# Patient Record
Sex: MALE | Race: WHITE | NOT HISPANIC OR LATINO | URBAN - METROPOLITAN AREA
[De-identification: names, ages, dates, MRNs, and addresses within clinical notes are randomized per-mention and may not be internally consistent; named-entity substitution may affect disease eponyms.]

---

## 2019-03-13 VITALS
DIASTOLIC BLOOD PRESSURE: 60 MMHG | RESPIRATION RATE: 19 BRPM | SYSTOLIC BLOOD PRESSURE: 130 MMHG | WEIGHT: 270 LBS | DIASTOLIC BLOOD PRESSURE: 68 MMHG | DIASTOLIC BLOOD PRESSURE: 86 MMHG | DIASTOLIC BLOOD PRESSURE: 77 MMHG | RESPIRATION RATE: 21 BRPM | HEART RATE: 86 BPM | DIASTOLIC BLOOD PRESSURE: 67 MMHG | RESPIRATION RATE: 18 BRPM | DIASTOLIC BLOOD PRESSURE: 72 MMHG | DIASTOLIC BLOOD PRESSURE: 90 MMHG | SYSTOLIC BLOOD PRESSURE: 131 MMHG | RESPIRATION RATE: 25 BRPM | SYSTOLIC BLOOD PRESSURE: 128 MMHG | OXYGEN SATURATION: 94 % | TEMPERATURE: 96.7 F | DIASTOLIC BLOOD PRESSURE: 58 MMHG | HEART RATE: 72 BPM | HEART RATE: 82 BPM | OXYGEN SATURATION: 97 % | DIASTOLIC BLOOD PRESSURE: 65 MMHG | TEMPERATURE: 97.9 F | HEART RATE: 93 BPM | OXYGEN SATURATION: 100 % | SYSTOLIC BLOOD PRESSURE: 113 MMHG | RESPIRATION RATE: 22 BRPM | HEART RATE: 67 BPM | HEART RATE: 74 BPM | SYSTOLIC BLOOD PRESSURE: 149 MMHG | HEART RATE: 77 BPM | SYSTOLIC BLOOD PRESSURE: 127 MMHG | DIASTOLIC BLOOD PRESSURE: 80 MMHG | DIASTOLIC BLOOD PRESSURE: 91 MMHG | SYSTOLIC BLOOD PRESSURE: 112 MMHG | OXYGEN SATURATION: 98 % | HEART RATE: 71 BPM | HEART RATE: 75 BPM | HEIGHT: 71 IN | HEART RATE: 73 BPM | SYSTOLIC BLOOD PRESSURE: 114 MMHG | SYSTOLIC BLOOD PRESSURE: 102 MMHG | OXYGEN SATURATION: 92 % | DIASTOLIC BLOOD PRESSURE: 78 MMHG | HEART RATE: 69 BPM | DIASTOLIC BLOOD PRESSURE: 62 MMHG | SYSTOLIC BLOOD PRESSURE: 111 MMHG | SYSTOLIC BLOOD PRESSURE: 145 MMHG | SYSTOLIC BLOOD PRESSURE: 116 MMHG | RESPIRATION RATE: 16 BRPM | OXYGEN SATURATION: 99 % | DIASTOLIC BLOOD PRESSURE: 70 MMHG

## 2019-03-14 PROBLEM — Z86.010 SURVEILLANCE DUE TO PRIOR COLONIC NEOPLASIA: Status: ACTIVE | Noted: 2019-03-15

## 2019-03-14 PROBLEM — Z86.010 PERSONAL HISTORY OF COLONIC POLYPS: Status: ACTIVE | Noted: 2019-03-15

## 2019-03-15 ENCOUNTER — AMBULATORY SURGICAL CENTER (AMBULATORY)
Dept: URBAN - METROPOLITAN AREA SURGERY 17 | Facility: SURGERY | Age: 59
End: 2019-03-15
Payer: COMMERCIAL

## 2019-03-15 ENCOUNTER — OFFICE (AMBULATORY)
Dept: URBAN - METROPOLITAN AREA PATHOLOGY 4 | Facility: PATHOLOGY | Age: 59
End: 2019-03-15
Payer: COMMERCIAL

## 2019-03-15 DIAGNOSIS — Z86.010 PERSONAL HISTORY OF COLONIC POLYPS: ICD-10-CM

## 2019-03-15 DIAGNOSIS — D12.2 BENIGN NEOPLASM OF ASCENDING COLON: ICD-10-CM

## 2019-03-15 DIAGNOSIS — K62.1 RECTAL POLYP: ICD-10-CM

## 2019-03-15 DIAGNOSIS — K57.30 DIVERTICULOSIS OF LARGE INTESTINE WITHOUT PERFORATION OR ABS: ICD-10-CM

## 2019-03-15 DIAGNOSIS — K63.5 POLYP OF COLON: ICD-10-CM

## 2019-03-15 DIAGNOSIS — D12.5 BENIGN NEOPLASM OF SIGMOID COLON: ICD-10-CM

## 2019-03-15 DIAGNOSIS — D12.8 BENIGN NEOPLASM OF RECTUM: ICD-10-CM

## 2019-03-15 LAB
GI HISTOLOGY: A. UNSPECIFIED: (no result)
GI HISTOLOGY: B. UNSPECIFIED: (no result)
GI HISTOLOGY: C. UNSPECIFIED: (no result)
GI HISTOLOGY: PDF REPORT: (no result)

## 2019-03-15 PROCEDURE — 45380 COLONOSCOPY AND BIOPSY: CPT | Mod: 33,59 | Performed by: INTERNAL MEDICINE

## 2019-03-15 PROCEDURE — 45380 COLONOSCOPY AND BIOPSY: CPT | Mod: 59,33 | Performed by: INTERNAL MEDICINE

## 2019-03-15 PROCEDURE — 88305 TISSUE EXAM BY PATHOLOGIST: CPT | Mod: 33 | Performed by: INTERNAL MEDICINE

## 2019-03-15 PROCEDURE — 45385 COLONOSCOPY W/LESION REMOVAL: CPT | Mod: 33 | Performed by: INTERNAL MEDICINE

## 2019-03-15 NOTE — SERVICEHPINOTES
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 I did his initial screening colonoscopy at age 48, he had an adenomatous polyp. Followup colonoscopy 5 years ago was negative. He is here today for followup colonoscopy. He has no lower GI complaints. Family history is negative for polyps or colon cancer.

## 2019-07-19 ENCOUNTER — OFFICE VISIT (OUTPATIENT)
Dept: INTERNAL MEDICINE | Facility: CLINIC | Age: 59
End: 2019-07-19

## 2019-07-19 VITALS
WEIGHT: 274.8 LBS | DIASTOLIC BLOOD PRESSURE: 90 MMHG | RESPIRATION RATE: 20 BRPM | HEIGHT: 70 IN | OXYGEN SATURATION: 96 % | SYSTOLIC BLOOD PRESSURE: 142 MMHG | HEART RATE: 78 BPM | BODY MASS INDEX: 39.34 KG/M2

## 2019-07-19 DIAGNOSIS — G47.33 OSA (OBSTRUCTIVE SLEEP APNEA): ICD-10-CM

## 2019-07-19 DIAGNOSIS — Z11.59 NEED FOR HEPATITIS C SCREENING TEST: ICD-10-CM

## 2019-07-19 DIAGNOSIS — E78.5 HYPERLIPIDEMIA, UNSPECIFIED HYPERLIPIDEMIA TYPE: ICD-10-CM

## 2019-07-19 DIAGNOSIS — D12.6 TUBULAR ADENOMA OF COLON: ICD-10-CM

## 2019-07-19 DIAGNOSIS — R20.0 NUMBNESS OF FOOT: ICD-10-CM

## 2019-07-19 DIAGNOSIS — I51.89 DIASTOLIC DYSFUNCTION: ICD-10-CM

## 2019-07-19 DIAGNOSIS — I10 ESSENTIAL HYPERTENSION: Primary | ICD-10-CM

## 2019-07-19 PROCEDURE — 99214 OFFICE O/P EST MOD 30 MIN: CPT | Performed by: INTERNAL MEDICINE

## 2019-07-19 RX ORDER — LOSARTAN POTASSIUM 100 MG/1
TABLET ORAL
Refills: 3 | COMMUNITY
Start: 2019-07-06 | End: 2019-07-19 | Stop reason: SDUPTHER

## 2019-07-19 RX ORDER — LOSARTAN POTASSIUM 100 MG/1
100 TABLET ORAL DAILY
Qty: 30 TABLET | Refills: 3 | Status: SHIPPED | OUTPATIENT
Start: 2019-07-19 | End: 2019-09-16 | Stop reason: SDUPTHER

## 2019-07-19 RX ORDER — HYDROCHLOROTHIAZIDE 25 MG/1
25 TABLET ORAL DAILY
Qty: 30 TABLET | Refills: 3 | Status: SHIPPED | OUTPATIENT
Start: 2019-07-19 | End: 2019-09-16 | Stop reason: SDUPTHER

## 2019-07-19 RX ORDER — HYDROCHLOROTHIAZIDE 12.5 MG/1
12.5 TABLET ORAL DAILY
Refills: 0 | COMMUNITY
Start: 2019-07-06 | End: 2019-07-19

## 2019-07-19 RX ORDER — ATORVASTATIN CALCIUM 20 MG/1
20 TABLET, FILM COATED ORAL DAILY
Qty: 30 TABLET | Refills: 3 | Status: SHIPPED | OUTPATIENT
Start: 2019-07-19 | End: 2019-09-16 | Stop reason: SDUPTHER

## 2019-07-19 RX ORDER — KETOCONAZOLE 20 MG/ML
SHAMPOO TOPICAL
Refills: 0 | COMMUNITY
Start: 2019-06-26

## 2019-07-19 RX ORDER — ATORVASTATIN CALCIUM 20 MG/1
20 TABLET, FILM COATED ORAL DAILY
Refills: 0 | COMMUNITY
Start: 2019-07-06 | End: 2019-07-19 | Stop reason: SDUPTHER

## 2019-07-19 NOTE — PROGRESS NOTES
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 He has not seen any flashing lights. He called Dr. Rubio and they scheduled an OV for August. He denies chest pain, dyspnea, or abdominal pain. His edema has improved. He uses his CPAP machine nightly. He had numbness in both feet up to the ankle recently. He has no back pain.  Allergies   Allergen Reactions   • Avelox [Moxifloxacin Hcl] Rash       Current Outpatient Medications on File Prior to Visit   Medication Sig Dispense Refill   • ketoconazole (NIZORAL) 2 % shampoo   0   • [DISCONTINUED] atorvastatin (LIPITOR) 20 MG tablet Take 20 mg by mouth Daily.  0   • [DISCONTINUED] hydrochlorothiazide (HYDRODIURIL) 12.5 MG tablet Take 12.5 mg by mouth Daily.  0   • [DISCONTINUED] losartan (COZAAR) 100 MG tablet TK ONE T PO QD  3     No current facility-administered medications on file prior to visit.        Past Medical History:   Diagnosis Date   • Cellulitis of lower extremity    • Chronic venous hypertension with complication involving both sides    • Decreased libido    • Diastolic dysfunction    • Eczema    • Hyperlipidemia    • Hypertension    • Leg swelling    • Low testosterone level in male    • Nocturia    • Numbness of foot    • Obesity (BMI 30-39.9)    • ROBERT (obstructive sleep apnea)    • Tubular adenoma of colon 03/15/2019    X2   • Varicose veins of legs        Past Surgical History:   Procedure Laterality Date   • COLONOSCOPY  03/15/2019    dr menjivar   • INGUINAL HERNIA REPAIR     • LASIK     • TONSILLECTOMY         Family History   Problem Relation Age of  "Onset   • Pneumonia Mother    • Dementia Mother    • Heart failure Father    • Hypertension Father    • Hypertension Brother        Social History     Socioeconomic History   • Marital status:      Spouse name: Not on file   • Number of children: Not on file   • Years of education: Not on file   • Highest education level: Not on file   Tobacco Use   • Smoking status: Never Smoker   • Smokeless tobacco: Never Used   Substance and Sexual Activity   • Alcohol use: Yes     Frequency: Monthly or less     Comment: occ   • Drug use: No   • Sexual activity: Defer           The following portions of the patient's history were reviewed and updated as appropriate: problem list, allergies, current medications, past medical history, past family history, past social history and past surgical history.    Review of Systems   Respiratory: Negative for shortness of breath.    Cardiovascular: Negative for chest pain.   Neurological: Positive for numbness.       Immunization History   Administered Date(s) Administered   • Flu Vaccine Intradermal Quad 18-64YR 10/18/2018   • Hepatitis A 05/22/2018, 12/27/2018   • Td 05/23/2009   • Tdap 06/19/2019       Objective   Vitals:    07/19/19 0758   BP: 142/90   Pulse: 78   Resp: 20   SpO2: 96%   Weight: 125 kg (274 lb 12.8 oz)   Height: 177.8 cm (70\")     Physical Exam   Constitutional: He appears well-developed and well-nourished.   HENT:   Head: Normocephalic and atraumatic.   Cardiovascular: Normal rate, regular rhythm, S1 normal, S2 normal and normal heart sounds.   Pulmonary/Chest: Effort normal and breath sounds normal.   Musculoskeletal:   1+ edema at ankles    2+ DTRs at knees and ankles    No sores on feet   Neurological: He is alert.   Skin: Skin is warm.   Psychiatric: He has a normal mood and affect.   Vitals reviewed.      Procedures    Assessment/Plan   New was seen today for follow-up, hyperlipidemia and hypertension.    Diagnoses and all orders for this " visit:    Essential hypertension  -     hydrochlorothiazide (HYDRODIURIL) 25 MG tablet; Take 1 tablet by mouth Daily.  -     losartan (COZAAR) 100 MG tablet; Take 1 tablet by mouth Daily.  -     Basic Metabolic Panel; Future    Diastolic dysfunction    ROBERT (obstructive sleep apnea)    Numbness of foot  -     Basic Metabolic Panel  -     TSH  -     Vitamin B12  -     RPR  -     Protein Elec + Interp, Serum  -     Protein Electrophoresis, Random Urine - Urine, Clean Catch  -     Hemoglobin A1c    Hyperlipidemia, unspecified hyperlipidemia type  -     atorvastatin (LIPITOR) 20 MG tablet; Take 1 tablet by mouth Daily.    Need for hepatitis C screening test  -     HCV Antibody Rfx To Qnt PCR    Tubular adenoma of colon             Increase HCTZ. Treatment for his diastolic dysfunction is better control of his BP. Check blood work today to eval his foot numbness. Discussed weight loss. Reviewed his cscope and colon polyps.    Return in about 4 weeks (around 8/16/2019).

## 2019-07-20 LAB
HCV AB S/CO SERPL IA: <0.1 S/CO RATIO (ref 0–0.9)
HCV AB SERPL QL IA: NORMAL

## 2019-07-22 LAB
ALBUMIN SERPL ELPH-MCNC: 3.9 G/DL (ref 2.9–4.4)
ALBUMIN/GLOB SERPL: 1.6 {RATIO} (ref 0.7–1.7)
ALPHA1 GLOB SERPL ELPH-MCNC: 0.2 G/DL (ref 0–0.4)
ALPHA2 GLOB SERPL ELPH-MCNC: 0.6 G/DL (ref 0.4–1)
B-GLOBULIN SERPL ELPH-MCNC: 0.8 G/DL (ref 0.7–1.3)
BUN SERPL-MCNC: 19 MG/DL (ref 8–23)
BUN/CREAT SERPL: 20.4 (ref 7–25)
CALCIUM SERPL-MCNC: 9.1 MG/DL (ref 8.6–10.5)
CHLORIDE SERPL-SCNC: 101 MMOL/L (ref 98–107)
CO2 SERPL-SCNC: 29 MMOL/L (ref 22–29)
CREAT SERPL-MCNC: 0.93 MG/DL (ref 0.76–1.27)
GAMMA GLOB SERPL ELPH-MCNC: 0.8 G/DL (ref 0.4–1.8)
GLOBULIN SER CALC-MCNC: 2.5 G/DL (ref 2.2–3.9)
GLUCOSE SERPL-MCNC: 105 MG/DL (ref 65–99)
HBA1C MFR BLD: 5.7 % (ref 4.8–5.6)
LABORATORY COMMENT REPORT: NORMAL
M PROTEIN SERPL ELPH-MCNC: NORMAL G/DL
POTASSIUM SERPL-SCNC: 3.9 MMOL/L (ref 3.5–5.2)
PROT PATTERN SERPL ELPH-IMP: NORMAL
PROT SERPL-MCNC: 6.4 G/DL (ref 6–8.5)
RPR SER QL: NORMAL
SODIUM SERPL-SCNC: 142 MMOL/L (ref 136–145)
TSH SERPL DL<=0.005 MIU/L-ACNC: 2.02 MIU/ML (ref 0.27–4.2)
VIT B12 SERPL-MCNC: 415 PG/ML (ref 211–946)

## 2019-07-25 ENCOUNTER — TELEPHONE (OUTPATIENT)
Dept: INTERNAL MEDICINE | Facility: CLINIC | Age: 59
End: 2019-07-25

## 2019-07-25 NOTE — TELEPHONE ENCOUNTER
msg left for pt to return call to be informed  ----- Message from Ania Renae sent at 7/25/2019 10:53 AM EDT -----      ----- Message -----  From: Joseph Harris MD  Sent: 7/22/2019   3:22 PM  To: Zee Bowers MA    Thyroid, b12, a1c all okay

## 2019-08-05 ENCOUNTER — RESULTS ENCOUNTER (OUTPATIENT)
Dept: INTERNAL MEDICINE | Facility: CLINIC | Age: 59
End: 2019-08-05

## 2019-08-05 DIAGNOSIS — I10 ESSENTIAL HYPERTENSION: ICD-10-CM

## 2019-08-23 ENCOUNTER — OFFICE VISIT (OUTPATIENT)
Dept: INTERNAL MEDICINE | Facility: CLINIC | Age: 59
End: 2019-08-23

## 2019-08-23 VITALS
SYSTOLIC BLOOD PRESSURE: 126 MMHG | WEIGHT: 278.2 LBS | HEIGHT: 71 IN | DIASTOLIC BLOOD PRESSURE: 78 MMHG | BODY MASS INDEX: 38.95 KG/M2

## 2019-08-23 DIAGNOSIS — E66.01 CLASS 2 SEVERE OBESITY DUE TO EXCESS CALORIES WITH SERIOUS COMORBIDITY AND BODY MASS INDEX (BMI) OF 38.0 TO 38.9 IN ADULT (HCC): ICD-10-CM

## 2019-08-23 DIAGNOSIS — Z12.5 SCREENING FOR PROSTATE CANCER: ICD-10-CM

## 2019-08-23 DIAGNOSIS — I10 ESSENTIAL HYPERTENSION: Primary | ICD-10-CM

## 2019-08-23 PROBLEM — H43.819 VITREOUS DETACHMENT: Status: ACTIVE | Noted: 2019-08-23

## 2019-08-23 PROBLEM — E66.812 CLASS 2 SEVERE OBESITY WITH SERIOUS COMORBIDITY AND BODY MASS INDEX (BMI) OF 38.0 TO 38.9 IN ADULT: Status: ACTIVE | Noted: 2019-08-23

## 2019-08-23 PROCEDURE — 99213 OFFICE O/P EST LOW 20 MIN: CPT | Performed by: INTERNAL MEDICINE

## 2019-08-23 NOTE — PROGRESS NOTES
Subjective        Chief Complaint   Patient presents with   • Hypertension     4 week fup review labs recent eye visit for floaters    • Med Refill       PHQ-2 Depression Screening  Little interest or pleasure in doing things? 0   Feeling down, depressed, or hopeless? 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 Rizwana Rubio for his left eye and he was told that he has a posterior vitreous detachment. His BP has been 135-140/80-86. He denies chest pain or dyspnea. He saw derm for an area on his right cheek and he had an SK frozen.  Allergies   Allergen Reactions   • Avelox [Moxifloxacin Hcl] Rash       Current Outpatient Medications on File Prior to Visit   Medication Sig Dispense Refill   • atorvastatin (LIPITOR) 20 MG tablet Take 1 tablet by mouth Daily. 30 tablet 3   • hydrochlorothiazide (HYDRODIURIL) 25 MG tablet Take 1 tablet by mouth Daily. 30 tablet 3   • ketoconazole (NIZORAL) 2 % shampoo   0   • losartan (COZAAR) 100 MG tablet Take 1 tablet by mouth Daily. 30 tablet 3     No current facility-administered medications on file prior to visit.        Past Medical History:   Diagnosis Date   • Cellulitis of lower extremity    • Chronic venous hypertension with complication involving both sides    • Decreased libido    • Diastolic dysfunction    • Eczema    • Hyperlipidemia    • Hypertension    • Leg swelling    • Low testosterone level in male    • Nocturia    • Numbness of foot    • Obesity (BMI 30-39.9)    • ROBERT (obstructive sleep apnea)    • Tubular adenoma of colon 03/15/2019    X2   • Varicose veins  "of legs        Past Surgical History:   Procedure Laterality Date   • COLONOSCOPY  03/15/2019    dr menjivar   • INGUINAL HERNIA REPAIR Bilateral    • LASIK     • TONSILLECTOMY AND ADENOIDECTOMY         Family History   Problem Relation Age of Onset   • Pneumonia Mother    • Dementia Mother    • Heart failure Father    • Hypertension Father    • Hypertension Brother        Social History     Socioeconomic History   • Marital status:      Spouse name: Not on file   • Number of children: Not on file   • Years of education: Not on file   • Highest education level: Not on file   Tobacco Use   • Smoking status: Never Smoker   • Smokeless tobacco: Never Used   Substance and Sexual Activity   • Alcohol use: Yes     Frequency: Monthly or less     Comment: occ   • Drug use: No   • Sexual activity: Defer           The following portions of the patient's history were reviewed and updated as appropriate: problem list, allergies, current medications, past medical history, past family history, past social history and past surgical history.    Review of Systems   Respiratory: Negative for shortness of breath.    Cardiovascular: Negative for chest pain.       Immunization History   Administered Date(s) Administered   • Flu Vaccine Intradermal Quad 18-64YR 10/18/2018   • Hepatitis A 05/22/2018, 12/27/2018   • Td 05/23/2009   • Tdap 06/19/2019       Objective   Vitals:    08/23/19 1614   BP: 126/78   Weight: 126 kg (278 lb 3.2 oz)   Height: 180.3 cm (71\")     Physical Exam   Constitutional: He appears well-developed and well-nourished.   HENT:   Head: Normocephalic and atraumatic.   Cardiovascular: Normal rate, regular rhythm, S1 normal, S2 normal and normal heart sounds.   Trace edema   Pulmonary/Chest: Effort normal and breath sounds normal.   Neurological: He is alert.   Skin: Skin is warm.   Psychiatric: He has a normal mood and affect.   Vitals reviewed.      Procedures    Assessment/Plan   New was seen today for " hypertension and med refill.    Diagnoses and all orders for this visit:    Essential hypertension  -     Comprehensive Metabolic Panel; Future  -     Lipid Panel With / Chol / HDL Ratio; Future    Screening for prostate cancer  -     PSA Screen; Future    Class 2 severe obesity due to excess calories with serious comorbidity and body mass index (BMI) of 38.0 to 38.9 in adult (CMS/[facility])             BP is good. Labs reviewed. Discussed losing weight.     Return in about 4 months (around 12/23/2019) for Annual physical.

## 2019-09-16 ENCOUNTER — TELEPHONE (OUTPATIENT)
Dept: INTERNAL MEDICINE | Facility: CLINIC | Age: 59
End: 2019-09-16

## 2019-09-16 DIAGNOSIS — I10 ESSENTIAL HYPERTENSION: ICD-10-CM

## 2019-09-16 DIAGNOSIS — E78.5 HYPERLIPIDEMIA, UNSPECIFIED HYPERLIPIDEMIA TYPE: ICD-10-CM

## 2019-09-16 RX ORDER — ATORVASTATIN CALCIUM 20 MG/1
20 TABLET, FILM COATED ORAL DAILY
Qty: 90 TABLET | Refills: 3 | Status: SHIPPED | OUTPATIENT
Start: 2019-09-16 | End: 2020-01-31 | Stop reason: SDUPTHER

## 2019-09-16 RX ORDER — HYDROCHLOROTHIAZIDE 25 MG/1
25 TABLET ORAL DAILY
Qty: 90 TABLET | Refills: 3 | Status: SHIPPED | OUTPATIENT
Start: 2019-09-16 | End: 2020-01-31 | Stop reason: SDUPTHER

## 2019-09-16 RX ORDER — LOSARTAN POTASSIUM 100 MG/1
100 TABLET ORAL DAILY
Qty: 90 TABLET | Refills: 3 | Status: SHIPPED | OUTPATIENT
Start: 2019-09-16 | End: 2020-01-31 | Stop reason: SDUPTHER

## 2019-09-16 NOTE — TELEPHONE ENCOUNTER
Patient is wanting his prescriptions switched to [facility] for Losartan, Atorvastatin, and HCTZ, patients insurance states he must use mail order.

## 2019-12-23 ENCOUNTER — RESULTS ENCOUNTER (OUTPATIENT)
Dept: INTERNAL MEDICINE | Facility: CLINIC | Age: 59
End: 2019-12-23

## 2019-12-23 DIAGNOSIS — I10 ESSENTIAL HYPERTENSION: ICD-10-CM

## 2019-12-23 DIAGNOSIS — Z12.5 SCREENING FOR PROSTATE CANCER: ICD-10-CM

## 2020-01-17 ENCOUNTER — OFFICE VISIT (OUTPATIENT)
Dept: INTERNAL MEDICINE | Facility: CLINIC | Age: 60
End: 2020-01-17

## 2020-01-17 VITALS
HEART RATE: 66 BPM | WEIGHT: 255.4 LBS | SYSTOLIC BLOOD PRESSURE: 132 MMHG | BODY MASS INDEX: 35.76 KG/M2 | DIASTOLIC BLOOD PRESSURE: 84 MMHG | HEIGHT: 71 IN

## 2020-01-17 DIAGNOSIS — Z00.00 WELLNESS EXAMINATION: Primary | ICD-10-CM

## 2020-01-17 DIAGNOSIS — I10 ESSENTIAL HYPERTENSION: ICD-10-CM

## 2020-01-17 DIAGNOSIS — G47.33 OSA (OBSTRUCTIVE SLEEP APNEA): ICD-10-CM

## 2020-01-17 DIAGNOSIS — E78.49 OTHER HYPERLIPIDEMIA: ICD-10-CM

## 2020-01-17 PROCEDURE — 99396 PREV VISIT EST AGE 40-64: CPT | Performed by: INTERNAL MEDICINE

## 2020-01-17 RX ORDER — METOPROLOL SUCCINATE 50 MG/1
50 TABLET, EXTENDED RELEASE ORAL DAILY
Qty: 30 TABLET | Refills: 2 | Status: SHIPPED | OUTPATIENT
Start: 2020-01-17 | End: 2020-03-20 | Stop reason: SDUPTHER

## 2020-01-17 NOTE — PROGRESS NOTES
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 He is exercising 4 days per week. He is down 23 pounds. He feels better. His edema is better. He is seeing Dr. Rubio for left posterior vitreous detachment. He uses his CPAP nightly. He sleeps 4.5 hours per night.  Allergies   Allergen Reactions   • Avelox [Moxifloxacin Hcl] Rash       Current Outpatient Medications on File Prior to Visit   Medication Sig Dispense Refill   • atorvastatin (LIPITOR) 20 MG tablet Take 1 tablet by mouth Daily. 90 tablet 3   • hydrochlorothiazide (HYDRODIURIL) 25 MG tablet Take 1 tablet by mouth Daily. 90 tablet 3   • ketoconazole (NIZORAL) 2 % shampoo   0   • losartan (COZAAR) 100 MG tablet Take 1 tablet by mouth Daily. 90 tablet 3     No current facility-administered medications on file prior to visit.        Past Medical History:   Diagnosis Date   • Cellulitis of lower extremity    • Chronic venous hypertension with complication involving both sides    • Decreased libido    • Diastolic dysfunction    • Eczema    • Hyperlipidemia    • Hypertension    • Leg swelling    • Low testosterone level in male    • Nocturia    • Numbness of foot    • Obesity (BMI 30-39.9)    • ROBERT (obstructive sleep apnea)    • Tubular adenoma of colon 03/15/2019    X2   • Varicose veins of legs        Past Surgical History:   Procedure Laterality Date   • COLONOSCOPY   03/15/2019    dr menjivar   • INGUINAL HERNIA REPAIR Bilateral    • LASIK     • TONSILLECTOMY AND ADENOIDECTOMY         Family History   Problem Relation Age of Onset   • Pneumonia Mother    • Dementia Mother    • Heart failure Father    • Hypertension Father    • Hypertension Brother        Social History     Socioeconomic History   • Marital status:      Spouse name: Not on file   • Number of children: Not on file   • Years of education: Not on file   • Highest education level: Not on file   Tobacco Use   • Smoking status: Never Smoker   • Smokeless tobacco: Never Used   Substance and Sexual Activity   • Alcohol use: Yes     Frequency: Monthly or less     Comment: occ   • Drug use: No   • Sexual activity: Defer           The following portions of the patient's history were reviewed and updated as appropriate: problem list, allergies, current medications, past medical history, past family history, past social history and past surgical history.    Review of Systems   Constitutional: Negative for chills, fever and unexpected weight loss.   HENT: Negative for postnasal drip and sore throat.    Eyes: Positive for visual disturbance. Negative for blurred vision.   Respiratory: Negative for cough, shortness of breath and wheezing.    Cardiovascular: Negative for chest pain and leg swelling.   Gastrointestinal: Negative for abdominal pain, blood in stool, nausea, vomiting and GERD.   Endocrine: Negative for polyuria.   Genitourinary: Negative for dysuria, frequency and hematuria.   Musculoskeletal: Negative for gait problem.   Skin: Negative for rash.   Allergic/Immunologic: Negative for immunocompromised state.   Neurological: Negative for weakness.   Hematological: Does not bruise/bleed easily.   Psychiatric/Behavioral: Negative for depressed mood. The patient is not nervous/anxious.        Immunization History   Administered Date(s) Administered   • Flu Vaccine Intradermal Quad 18-64YR 10/18/2018   • Flu Vaccine  "Quad PF >36MO 10/18/2018, 10/29/2019   • Hepatitis A 05/22/2018, 12/27/2018   • Td 05/23/2009   • Tdap 06/19/2019       Objective   Vitals:    01/17/20 1053   BP: 132/84   Pulse: 66   Weight: 116 kg (255 lb 6.4 oz)   Height: 180.3 cm (71\")     Body mass index is 35.62 kg/m².  Physical Exam   Constitutional: He appears well-developed and well-nourished.   HENT:   Head: Normocephalic and atraumatic.   Mouth/Throat: Oropharynx is clear and moist.   Eyes: Pupils are equal, round, and reactive to light. Conjunctivae and EOM are normal.   Neck: Neck supple. Carotid bruit is not present. No thyromegaly present.   Cardiovascular: Normal rate, regular rhythm and normal heart sounds.   No murmur heard.  Venous insufficiency in his legs   Pulmonary/Chest: Effort normal and breath sounds normal.   Abdominal: Soft. He exhibits no distension and no mass. There is no tenderness. There is no rebound.   Genitourinary: Rectum normal and prostate normal.   Lymphadenopathy:     He has no cervical adenopathy.   Neurological: He is alert.   Skin: Skin is warm.   Psychiatric: He has a normal mood and affect.   Vitals reviewed.      Procedures    Assessment/Plan   New was seen today for annual exam, hypertension and med refill.    Diagnoses and all orders for this visit:    Wellness examination    ROBETR (obstructive sleep apnea)    Other hyperlipidemia    Essential hypertension  -     metoprolol succinate XL (TOPROL-XL) 50 MG 24 hr tablet; Take 1 tablet by mouth Daily.             Reviewed labs. Discussed exercise; he is losing wt on purpose. BP is borderline so I will add Toprol; he was intolerant of Norvasc in the past with worsening edema. Discussed Shingrix. LDL is good. He uses a CPAP.    Return in about 2 months (around 3/17/2020), or 30 minutes.  "

## 2020-01-31 DIAGNOSIS — I10 ESSENTIAL HYPERTENSION: ICD-10-CM

## 2020-01-31 DIAGNOSIS — E78.5 HYPERLIPIDEMIA, UNSPECIFIED HYPERLIPIDEMIA TYPE: ICD-10-CM

## 2020-01-31 RX ORDER — LOSARTAN POTASSIUM 100 MG/1
100 TABLET ORAL DAILY
Qty: 90 TABLET | Refills: 3 | Status: SHIPPED | OUTPATIENT
Start: 2020-01-31 | End: 2020-09-24 | Stop reason: SDUPTHER

## 2020-01-31 RX ORDER — HYDROCHLOROTHIAZIDE 25 MG/1
25 TABLET ORAL DAILY
Qty: 90 TABLET | Refills: 3 | Status: SHIPPED | OUTPATIENT
Start: 2020-01-31 | End: 2020-09-24 | Stop reason: SDUPTHER

## 2020-01-31 RX ORDER — ATORVASTATIN CALCIUM 20 MG/1
20 TABLET, FILM COATED ORAL DAILY
Qty: 90 TABLET | Refills: 3 | Status: SHIPPED | OUTPATIENT
Start: 2020-01-31 | End: 2020-09-24 | Stop reason: SDUPTHER

## 2020-03-20 ENCOUNTER — OFFICE VISIT (OUTPATIENT)
Dept: INTERNAL MEDICINE | Facility: CLINIC | Age: 60
End: 2020-03-20

## 2020-03-20 VITALS
SYSTOLIC BLOOD PRESSURE: 118 MMHG | HEIGHT: 71 IN | WEIGHT: 245 LBS | DIASTOLIC BLOOD PRESSURE: 82 MMHG | HEART RATE: 78 BPM | BODY MASS INDEX: 34.3 KG/M2

## 2020-03-20 DIAGNOSIS — I10 ESSENTIAL HYPERTENSION: Primary | ICD-10-CM

## 2020-03-20 DIAGNOSIS — E66.01 CLASS 2 SEVERE OBESITY DUE TO EXCESS CALORIES WITH SERIOUS COMORBIDITY AND BODY MASS INDEX (BMI) OF 38.0 TO 38.9 IN ADULT (HCC): ICD-10-CM

## 2020-03-20 PROCEDURE — 99213 OFFICE O/P EST LOW 20 MIN: CPT | Performed by: INTERNAL MEDICINE

## 2020-03-20 RX ORDER — METOPROLOL SUCCINATE 50 MG/1
50 TABLET, EXTENDED RELEASE ORAL DAILY
Qty: 90 TABLET | Refills: 3 | Status: SHIPPED | OUTPATIENT
Start: 2020-03-20 | End: 2020-09-08 | Stop reason: SDUPTHER

## 2020-03-20 NOTE — PROGRESS NOTES
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 He has not problems with med changes. He denies chest pain or dyspnea. He is exercising 5 days per week. He has lost almost 30 pounds and he felt like his weight loss has plateaued.  Allergies   Allergen Reactions   • Avelox [Moxifloxacin Hcl] Rash       Current Outpatient Medications on File Prior to Visit   Medication Sig Dispense Refill   • atorvastatin (LIPITOR) 20 MG tablet Take 1 tablet by mouth Daily. 90 tablet 3   • hydroCHLOROthiazide (HYDRODIURIL) 25 MG tablet Take 1 tablet by mouth Daily. 90 tablet 3   • ketoconazole (NIZORAL) 2 % shampoo   0   • losartan (COZAAR) 100 MG tablet Take 1 tablet by mouth Daily. 90 tablet 3   • [DISCONTINUED] metoprolol succinate XL (TOPROL-XL) 50 MG 24 hr tablet Take 1 tablet by mouth Daily. 30 tablet 2     No current facility-administered medications on file prior to visit.        Past Medical History:   Diagnosis Date   • Cellulitis of lower extremity    • Chronic venous hypertension with complication involving both sides    • Decreased libido    • Diastolic dysfunction    • Eczema    • Hyperlipidemia    • Hypertension    • Leg swelling    • Low testosterone level in male    • Nocturia    • Numbness of foot    • Obesity (BMI 30-39.9)    • ROBERT (obstructive sleep apnea)    • Tubular adenoma of colon 03/15/2019    X2   • Varicose veins of legs        Past Surgical History:   Procedure Laterality Date   • COLONOSCOPY  03/15/2019    dr menjivar  "  • INGUINAL HERNIA REPAIR Bilateral    • LASIK     • TONSILLECTOMY AND ADENOIDECTOMY         Family History   Problem Relation Age of Onset   • Pneumonia Mother    • Dementia Mother    • Heart failure Father    • Hypertension Father    • Hypertension Brother        Social History     Socioeconomic History   • Marital status:      Spouse name: Not on file   • Number of children: Not on file   • Years of education: Not on file   • Highest education level: Not on file   Tobacco Use   • Smoking status: Never Smoker   • Smokeless tobacco: Never Used   Substance and Sexual Activity   • Alcohol use: Yes     Frequency: Monthly or less     Comment: occ   • Drug use: No   • Sexual activity: Defer           The following portions of the patient's history were reviewed and updated as appropriate: problem list, allergies, current medications, past medical history, past family history, past social history and past surgical history.    Review of Systems   Respiratory: Negative for shortness of breath.    Cardiovascular: Negative for chest pain.       Immunization History   Administered Date(s) Administered   • Flu Vaccine Intradermal Quad 18-64YR 10/18/2018   • Flu Vaccine Quad PF >36MO 10/18/2018, 10/29/2019   • Hepatitis A 05/22/2018, 12/27/2018   • Td 05/23/2009   • Tdap 06/19/2019       Objective   Vitals:    03/20/20 1407   BP: 118/82   Pulse: 78   Weight: 111 kg (245 lb)   Height: 180.3 cm (71\")     Body mass index is 34.17 kg/m².  Physical Exam   Constitutional: He appears well-developed and well-nourished.   HENT:   Head: Normocephalic and atraumatic.   Cardiovascular: Normal rate, regular rhythm, S1 normal, S2 normal and normal heart sounds.   Pulmonary/Chest: Effort normal and breath sounds normal.   Neurological: He is alert.   Skin: Skin is warm.   Psychiatric: He has a normal mood and affect.   Vitals reviewed.      Procedures    Assessment/Plan   New was seen today for hypertension.    Diagnoses and all " orders for this visit:    Essential hypertension  -     metoprolol succinate XL (TOPROL-XL) 50 MG 24 hr tablet; Take 1 tablet by mouth Daily.    Class 2 severe obesity due to excess calories with serious comorbidity and body mass index (BMI) of 38.0 to 38.9 in adult (CMS/[facility])             BP is excellent. He is losing wt on purpose. Discussed calorie intake and exercising 150 minutes per week.    Return in about 6 months (around 9/20/2020).

## 2020-09-08 ENCOUNTER — TELEPHONE (OUTPATIENT)
Dept: INTERNAL MEDICINE | Facility: CLINIC | Age: 60
End: 2020-09-08

## 2020-09-08 DIAGNOSIS — I10 ESSENTIAL HYPERTENSION: ICD-10-CM

## 2020-09-08 RX ORDER — METOPROLOL SUCCINATE 50 MG/1
50 TABLET, EXTENDED RELEASE ORAL DAILY
Qty: 90 TABLET | Refills: 3 | Status: SHIPPED | OUTPATIENT
Start: 2020-09-08 | End: 2020-09-24 | Stop reason: SDUPTHER

## 2020-09-24 ENCOUNTER — OFFICE VISIT (OUTPATIENT)
Dept: INTERNAL MEDICINE | Facility: CLINIC | Age: 60
End: 2020-09-24

## 2020-09-24 VITALS
BODY MASS INDEX: 34.58 KG/M2 | SYSTOLIC BLOOD PRESSURE: 132 MMHG | HEIGHT: 71 IN | WEIGHT: 247 LBS | DIASTOLIC BLOOD PRESSURE: 84 MMHG | TEMPERATURE: 98 F

## 2020-09-24 DIAGNOSIS — E78.49 OTHER HYPERLIPIDEMIA: ICD-10-CM

## 2020-09-24 DIAGNOSIS — Z12.5 PROSTATE CANCER SCREENING: ICD-10-CM

## 2020-09-24 DIAGNOSIS — I10 ESSENTIAL HYPERTENSION: Primary | ICD-10-CM

## 2020-09-24 PROCEDURE — 99213 OFFICE O/P EST LOW 20 MIN: CPT | Performed by: INTERNAL MEDICINE

## 2020-09-24 RX ORDER — ATORVASTATIN CALCIUM 20 MG/1
20 TABLET, FILM COATED ORAL DAILY
Qty: 90 TABLET | Refills: 3 | Status: SHIPPED | OUTPATIENT
Start: 2020-09-24 | End: 2021-01-07

## 2020-09-24 RX ORDER — METOPROLOL SUCCINATE 50 MG/1
50 TABLET, EXTENDED RELEASE ORAL DAILY
Qty: 90 TABLET | Refills: 3 | Status: SHIPPED | OUTPATIENT
Start: 2020-09-24 | End: 2021-03-26

## 2020-09-24 RX ORDER — HYDROCHLOROTHIAZIDE 25 MG/1
25 TABLET ORAL DAILY
Qty: 90 TABLET | Refills: 3 | Status: SHIPPED | OUTPATIENT
Start: 2020-09-24 | End: 2021-01-07

## 2020-09-24 RX ORDER — LOSARTAN POTASSIUM 100 MG/1
100 TABLET ORAL DAILY
Qty: 90 TABLET | Refills: 3 | Status: SHIPPED | OUTPATIENT
Start: 2020-09-24 | End: 2021-01-07

## 2020-09-24 NOTE — PROGRESS NOTES
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 He denies chest pain or dyspnea. His wt has gone up just a little. He still exercises but does not watch diet. He uses   Allergies   Allergen Reactions   • Avelox [Moxifloxacin Hcl] Rash       Current Outpatient Medications on File Prior to Visit   Medication Sig Dispense Refill   • ketoconazole (NIZORAL) 2 % shampoo   0   • [DISCONTINUED] atorvastatin (LIPITOR) 20 MG tablet Take 1 tablet by mouth Daily. 90 tablet 3   • [DISCONTINUED] hydroCHLOROthiazide (HYDRODIURIL) 25 MG tablet Take 1 tablet by mouth Daily. 90 tablet 3   • [DISCONTINUED] losartan (COZAAR) 100 MG tablet Take 1 tablet by mouth Daily. 90 tablet 3   • [DISCONTINUED] metoprolol succinate XL (TOPROL-XL) 50 MG 24 hr tablet Take 1 tablet by mouth Daily. 90 tablet 3     No current facility-administered medications on file prior to visit.        Past Medical History:   Diagnosis Date   • Cellulitis of lower extremity    • Chronic venous hypertension with complication involving both sides    • Decreased libido    • Diastolic dysfunction    • Eczema    • Hyperlipidemia    • Hypertension    • Leg swelling    • Low testosterone level in male    • Nocturia    • Numbness of foot    • Obesity (BMI 30-39.9)    • ROBERT (obstructive sleep apnea)    • Tubular adenoma of colon 03/15/2019    X2   • Varicose veins of legs        Past Surgical History:   Procedure Laterality Date   • COLONOSCOPY  03/15/2019    dr menjivar   • INGUINAL HERNIA  "REPAIR Bilateral    • LASIK     • TONSILLECTOMY AND ADENOIDECTOMY         Family History   Problem Relation Age of Onset   • Pneumonia Mother    • Dementia Mother    • Heart failure Father    • Hypertension Father    • Hypertension Brother        Social History     Socioeconomic History   • Marital status:      Spouse name: Not on file   • Number of children: Not on file   • Years of education: Not on file   • Highest education level: Not on file   Tobacco Use   • Smoking status: Never Smoker   • Smokeless tobacco: Never Used   Substance and Sexual Activity   • Alcohol use: Yes     Frequency: Monthly or less     Comment: occ   • Drug use: No   • Sexual activity: Defer           The following portions of the patient's history were reviewed and updated as appropriate: problem list, allergies, current medications, past medical history, past family history, past social history and past surgical history.    Review of Systems   Respiratory: Negative for shortness of breath.    Cardiovascular: Negative for chest pain.       Immunization History   Administered Date(s) Administered   • Flu Vaccine Intradermal Quad 18-64YR 10/18/2018   • Flu Vaccine Quad PF >36MO 10/18/2018, 10/29/2019   • Hepatitis A 05/22/2018, 12/27/2018   • Td 05/23/2009   • Tdap 06/19/2019       Objective   Vitals:    09/24/20 1647   BP: 132/84   Temp: 98 °F (36.7 °C)   Weight: 112 kg (247 lb)   Height: 180.3 cm (71\")     Body mass index is 34.45 kg/m².  Physical Exam  Vitals signs reviewed.   Constitutional:       Appearance: He is well-developed.   HENT:      Head: Normocephalic and atraumatic.   Cardiovascular:      Rate and Rhythm: Normal rate and regular rhythm.      Heart sounds: Normal heart sounds, S1 normal and S2 normal.   Pulmonary:      Effort: Pulmonary effort is normal.      Breath sounds: Normal breath sounds.   Skin:     General: Skin is warm.   Neurological:      Mental Status: He is alert.   Psychiatric:         Behavior: " Behavior normal.         Procedures    Assessment/Plan   New was seen today for hypertension.    Diagnoses and all orders for this visit:    Essential hypertension  -     hydroCHLOROthiazide (HYDRODIURIL) 25 MG tablet; Take 1 tablet by mouth Daily.  -     losartan (COZAAR) 100 MG tablet; Take 1 tablet by mouth Daily.  -     metoprolol succinate XL (TOPROL-XL) 50 MG 24 hr tablet; Take 1 tablet by mouth Daily.    Other hyperlipidemia  -     Comprehensive Metabolic Panel; Future  -     Lipid Panel With / Chol / HDL Ratio; Future  -     atorvastatin (LIPITOR) 20 MG tablet; Take 1 tablet by mouth Daily.    Prostate cancer screening  -     PSA Screen; Future             Reviewed labs. LDL is excellent. He will get the flu shot at work. BP is borderline; discussed being more diligent with diet to lose weight. Discussed Shingrix.    Return in about 6 months (around 3/24/2021) for Annual physical.

## 2021-01-07 DIAGNOSIS — I10 ESSENTIAL HYPERTENSION: ICD-10-CM

## 2021-01-07 DIAGNOSIS — E78.49 OTHER HYPERLIPIDEMIA: ICD-10-CM

## 2021-01-07 RX ORDER — HYDROCHLOROTHIAZIDE 25 MG/1
TABLET ORAL
Qty: 90 TABLET | Refills: 3 | Status: SHIPPED | OUTPATIENT
Start: 2021-01-07 | End: 2021-03-26 | Stop reason: SDUPTHER

## 2021-01-07 RX ORDER — ATORVASTATIN CALCIUM 20 MG/1
TABLET, FILM COATED ORAL
Qty: 90 TABLET | Refills: 3 | Status: SHIPPED | OUTPATIENT
Start: 2021-01-07 | End: 2021-03-26 | Stop reason: SDUPTHER

## 2021-01-07 RX ORDER — LOSARTAN POTASSIUM 100 MG/1
TABLET ORAL
Qty: 90 TABLET | Refills: 3 | Status: SHIPPED | OUTPATIENT
Start: 2021-01-07 | End: 2021-03-26 | Stop reason: SDUPTHER

## 2021-03-26 ENCOUNTER — OFFICE VISIT (OUTPATIENT)
Dept: INTERNAL MEDICINE | Facility: CLINIC | Age: 61
End: 2021-03-26

## 2021-03-26 VITALS
HEART RATE: 72 BPM | TEMPERATURE: 97.5 F | DIASTOLIC BLOOD PRESSURE: 84 MMHG | WEIGHT: 233 LBS | BODY MASS INDEX: 32.62 KG/M2 | SYSTOLIC BLOOD PRESSURE: 134 MMHG | HEIGHT: 71 IN

## 2021-03-26 DIAGNOSIS — Z00.00 WELLNESS EXAMINATION: Primary | ICD-10-CM

## 2021-03-26 DIAGNOSIS — I10 ESSENTIAL HYPERTENSION: Chronic | ICD-10-CM

## 2021-03-26 DIAGNOSIS — G47.33 OSA (OBSTRUCTIVE SLEEP APNEA): ICD-10-CM

## 2021-03-26 DIAGNOSIS — E78.49 OTHER HYPERLIPIDEMIA: Chronic | ICD-10-CM

## 2021-03-26 PROCEDURE — 99396 PREV VISIT EST AGE 40-64: CPT | Performed by: INTERNAL MEDICINE

## 2021-03-26 RX ORDER — HYDROCHLOROTHIAZIDE 25 MG/1
25 TABLET ORAL DAILY
Qty: 90 TABLET | Refills: 3 | Status: SHIPPED | OUTPATIENT
Start: 2021-03-26 | End: 2021-04-08 | Stop reason: SDUPTHER

## 2021-03-26 RX ORDER — ATORVASTATIN CALCIUM 20 MG/1
20 TABLET, FILM COATED ORAL DAILY
Qty: 90 TABLET | Refills: 3 | Status: SHIPPED | OUTPATIENT
Start: 2021-03-26 | End: 2021-04-08 | Stop reason: SDUPTHER

## 2021-03-26 RX ORDER — LOSARTAN POTASSIUM 100 MG/1
100 TABLET ORAL DAILY
Qty: 90 TABLET | Refills: 3 | Status: SHIPPED | OUTPATIENT
Start: 2021-03-26 | End: 2021-04-08 | Stop reason: SDUPTHER

## 2021-03-26 RX ORDER — METOPROLOL SUCCINATE 100 MG/1
100 TABLET, EXTENDED RELEASE ORAL DAILY
Qty: 90 TABLET | Refills: 3 | Status: SHIPPED | OUTPATIENT
Start: 2021-03-26 | End: 2021-04-08 | Stop reason: SDUPTHER

## 2021-03-26 NOTE — PROGRESS NOTES
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 He uses his CPAP nightly. He has been exercising 5 days per week.  Allergies   Allergen Reactions   • Avelox [Moxifloxacin Hcl] Rash       Current Outpatient Medications on File Prior to Visit   Medication Sig Dispense Refill   • ketoconazole (NIZORAL) 2 % shampoo   0   • [DISCONTINUED] atorvastatin (LIPITOR) 20 MG tablet TAKE 1 TABLET DAILY 90 tablet 3   • [DISCONTINUED] hydroCHLOROthiazide (HYDRODIURIL) 25 MG tablet TAKE 1 TABLET DAILY 90 tablet 3   • [DISCONTINUED] losartan (COZAAR) 100 MG tablet TAKE 1 TABLET DAILY 90 tablet 3   • [DISCONTINUED] metoprolol succinate XL (TOPROL-XL) 50 MG 24 hr tablet Take 1 tablet by mouth Daily. 90 tablet 3     No current facility-administered medications on file prior to visit.       Past Medical History:   Diagnosis Date   • Cellulitis of lower extremity    • Chronic venous hypertension with complication involving both sides    • Decreased libido    • Eczema    • Leg swelling    • Low testosterone level in male    • Nocturia    • Obesity (BMI 30-39.9)    • Tubular adenoma of colon 03/15/2019    X2   • Varicose veins of legs        Past Surgical History:   Procedure Laterality Date   • COLONOSCOPY  03/15/2019    dr menjivar   • INGUINAL HERNIA REPAIR Bilateral    • LASIK     • TONSILLECTOMY AND ADENOIDECTOMY         Family History   Problem Relation Age of Onset   • Pneumonia Mother    • Dementia Mother    • Heart failure  Father    • Hypertension Father    • Hypertension Brother        Social History     Socioeconomic History   • Marital status:      Spouse name: Not on file   • Number of children: Not on file   • Years of education: Not on file   • Highest education level: Not on file   Tobacco Use   • Smoking status: Never Smoker   • Smokeless tobacco: Never Used   Substance and Sexual Activity   • Alcohol use: Yes     Comment: rare   • Drug use: No   • Sexual activity: Defer           The following portions of the patient's history were reviewed and updated as appropriate: problem list, allergies, current medications, past medical history, past family history, past social history and past surgical history.    Review of Systems   Constitutional: Negative for chills, fever and unexpected weight loss.   HENT: Negative for postnasal drip and sore throat.    Eyes: Negative for blurred vision.   Respiratory: Negative for cough, shortness of breath and wheezing.    Cardiovascular: Negative for chest pain and leg swelling.   Gastrointestinal: Negative for abdominal pain, blood in stool, nausea, vomiting and GERD.   Endocrine: Negative for polyuria.   Genitourinary: Negative for dysuria, frequency and hematuria.   Musculoskeletal: Negative for gait problem.   Skin: Negative for rash.   Allergic/Immunologic: Negative for immunocompromised state.   Neurological: Negative for weakness.   Hematological: Does not bruise/bleed easily.   Psychiatric/Behavioral: Negative for depressed mood. The patient is not nervous/anxious.        Immunization History   Administered Date(s) Administered   • COVID-19 (PFIZER) 12/28/2020, 01/18/2021   • Flu Vaccine Intradermal Quad 18-64YR 10/18/2018   • Flu Vaccine Quad PF >36MO 10/18/2018, 10/29/2019   • Hepatitis A 05/22/2018, 12/27/2018   • Td 05/23/2009   • Tdap 06/19/2019       Objective   Vitals:    03/26/21 0754   BP: 134/84   Pulse: 72   Temp: 97.5 °F (36.4 °C)   Weight: 106 kg (233 lb)   Height:  "180.3 cm (71\")     Body mass index is 32.5 kg/m².  Physical Exam  Vitals reviewed.   Constitutional:       Appearance: He is well-developed.   HENT:      Head: Normocephalic and atraumatic.      Mouth/Throat:      Mouth: Mucous membranes are moist.      Pharynx: Oropharynx is clear.   Eyes:      Extraocular Movements: Extraocular movements intact.      Conjunctiva/sclera: Conjunctivae normal.      Pupils: Pupils are equal, round, and reactive to light.   Neck:      Thyroid: No thyromegaly.      Vascular: No carotid bruit.   Cardiovascular:      Rate and Rhythm: Normal rate and regular rhythm.      Heart sounds: Normal heart sounds. No murmur heard.     Pulmonary:      Effort: Pulmonary effort is normal.      Breath sounds: Normal breath sounds.   Abdominal:      General: There is no distension.      Palpations: Abdomen is soft. There is no mass.      Tenderness: There is no abdominal tenderness. There is no rebound.   Genitourinary:     Prostate: Enlarged. Not tender and no nodules present.      Rectum: Normal.   Musculoskeletal:      Cervical back: Neck supple.   Lymphadenopathy:      Cervical: No cervical adenopathy.   Skin:     General: Skin is warm.   Neurological:      Mental Status: He is alert.   Psychiatric:         Behavior: Behavior normal.         Procedures    Assessment/Plan   Diagnoses and all orders for this visit:    1. Wellness examination (Primary)    2. Essential hypertension  -     metoprolol succinate XL (Toprol XL) 100 MG 24 hr tablet; Take 1 tablet by mouth Daily.  Dispense: 90 tablet; Refill: 3  -     hydroCHLOROthiazide (HYDRODIURIL) 25 MG tablet; Take 1 tablet by mouth Daily.  Dispense: 90 tablet; Refill: 3  -     losartan (COZAAR) 100 MG tablet; Take 1 tablet by mouth Daily.  Dispense: 90 tablet; Refill: 3    3. Other hyperlipidemia  -     atorvastatin (LIPITOR) 20 MG tablet; Take 1 tablet by mouth Daily.  Dispense: 90 tablet; Refill: 3    4. ROBERT (obstructive sleep apnea)           "     Reviewed labs. Recc Shingrix. Discussed exercising 150 minutes per week. BP is elevated so I will increase Toprol. LDL is excellent. He uses a CPAP.  Return in about 2 months (around 5/26/2021).

## 2021-04-08 DIAGNOSIS — E78.49 OTHER HYPERLIPIDEMIA: Chronic | ICD-10-CM

## 2021-04-08 DIAGNOSIS — I10 ESSENTIAL HYPERTENSION: Chronic | ICD-10-CM

## 2021-04-08 RX ORDER — HYDROCHLOROTHIAZIDE 25 MG/1
25 TABLET ORAL DAILY
Qty: 90 TABLET | Refills: 3 | Status: SHIPPED | OUTPATIENT
Start: 2021-04-08 | End: 2021-12-17 | Stop reason: SDUPTHER

## 2021-04-08 RX ORDER — LOSARTAN POTASSIUM 100 MG/1
100 TABLET ORAL DAILY
Qty: 90 TABLET | Refills: 3 | Status: SHIPPED | OUTPATIENT
Start: 2021-04-08 | End: 2021-12-17 | Stop reason: SDUPTHER

## 2021-04-08 RX ORDER — METOPROLOL SUCCINATE 100 MG/1
100 TABLET, EXTENDED RELEASE ORAL DAILY
Qty: 90 TABLET | Refills: 3 | Status: SHIPPED | OUTPATIENT
Start: 2021-04-08 | End: 2021-12-17 | Stop reason: SDUPTHER

## 2021-04-08 RX ORDER — ATORVASTATIN CALCIUM 20 MG/1
20 TABLET, FILM COATED ORAL DAILY
Qty: 90 TABLET | Refills: 3 | Status: SHIPPED | OUTPATIENT
Start: 2021-04-08 | End: 2021-12-17 | Stop reason: SDUPTHER

## 2021-04-08 NOTE — TELEPHONE ENCOUNTER
Caller: New Krause MD    Relationship: Self    Best call back number:[phone]    Medication needed:   Requested Prescriptions     Pending Prescriptions Disp Refills   • losartan (COZAAR) 100 MG tablet 90 tablet 3     Sig: Take 1 tablet by mouth Daily.   • metoprolol succinate XL (Toprol XL) 100 MG 24 hr tablet 90 tablet 3     Sig: Take 1 tablet by mouth Daily.   • hydroCHLOROthiazide (HYDRODIURIL) 25 MG tablet 90 tablet 3     Sig: Take 1 tablet by mouth Daily.   • atorvastatin (LIPITOR) 20 MG tablet 90 tablet 3     Sig: Take 1 tablet by mouth Daily.       When do you need the refill by: ASAP      Does the patient have less than a 3 day supply:  [x] Yes  [] No    What is the patient's preferred pharmacy[facility] [place] - [address] AT PORTAL TO REG[facility] - [phone] [facility] [phone] FX

## 2021-06-11 ENCOUNTER — OFFICE VISIT (OUTPATIENT)
Dept: INTERNAL MEDICINE | Facility: CLINIC | Age: 61
End: 2021-06-11

## 2021-06-11 VITALS
DIASTOLIC BLOOD PRESSURE: 76 MMHG | TEMPERATURE: 98 F | SYSTOLIC BLOOD PRESSURE: 130 MMHG | HEIGHT: 71 IN | BODY MASS INDEX: 30.65 KG/M2 | WEIGHT: 218.9 LBS

## 2021-06-11 DIAGNOSIS — R68.89 FORGETFULNESS: ICD-10-CM

## 2021-06-11 DIAGNOSIS — I10 ESSENTIAL HYPERTENSION: Primary | Chronic | ICD-10-CM

## 2021-06-11 PROCEDURE — 99213 OFFICE O/P EST LOW 20 MIN: CPT | Performed by: INTERNAL MEDICINE

## 2021-06-11 NOTE — PROGRESS NOTES
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 He is exercises 5 days per week with brisk walk or treadmill. His diet has been low carbs and portion control. His BP has been 120-130/70s. He has to look up doses of meds more often. He denies problems with driving or finances. He only sleeps 4 to 4.5 hours per night. He uses a CPAP.  Allergies   Allergen Reactions   • Avelox [Moxifloxacin Hcl] Rash       Current Outpatient Medications on File Prior to Visit   Medication Sig Dispense Refill   • atorvastatin (LIPITOR) 20 MG tablet Take 1 tablet by mouth Daily. 90 tablet 3   • hydroCHLOROthiazide (HYDRODIURIL) 25 MG tablet Take 1 tablet by mouth Daily. 90 tablet 3   • ketoconazole (NIZORAL) 2 % shampoo   0   • losartan (COZAAR) 100 MG tablet Take 1 tablet by mouth Daily. 90 tablet 3   • metoprolol succinate XL (Toprol XL) 100 MG 24 hr tablet Take 1 tablet by mouth Daily. 90 tablet 3     No current facility-administered medications on file prior to visit.       Past Medical History:   Diagnosis Date   • Cellulitis of lower extremity    • Chronic venous hypertension with complication involving both sides    • Decreased libido    • Eczema    • Leg swelling    • Low testosterone level in male    • Nocturia    • Obesity (BMI 30-39.9)    • Tubular adenoma of colon 03/15/2019    X2   • Varicose veins of legs        Past Surgical History:   Procedure Laterality Date   • COLONOSCOPY  03/15/2019    dr menjivar   • INGUINAL HERNIA  "REPAIR Bilateral    • LASIK     • TONSILLECTOMY AND ADENOIDECTOMY         Family History   Problem Relation Age of Onset   • Pneumonia Mother    • Dementia Mother    • Heart failure Father    • Hypertension Father    • Hypertension Brother        Social History     Socioeconomic History   • Marital status:      Spouse name: Not on file   • Number of children: Not on file   • Years of education: Not on file   • Highest education level: Not on file   Tobacco Use   • Smoking status: Never Smoker   • Smokeless tobacco: Never Used   Substance and Sexual Activity   • Alcohol use: Yes     Comment: rare   • Drug use: No   • Sexual activity: Defer           The following portions of the patient's history were reviewed and updated as appropriate: problem list, allergies, current medications, past medical history, past family history, past social history and past surgical history.    Review of Systems    Immunization History   Administered Date(s) Administered   • COVID-19 (PFIZER) 12/28/2020, 01/18/2021   • Flu Vaccine Intradermal Quad 18-64YR 10/18/2018   • Flu Vaccine Quad PF >36MO 10/18/2018, 10/29/2019   • Hepatitis A 05/22/2018, 12/27/2018   • Td 05/23/2009   • Tdap 06/19/2019       Objective   Vitals:    06/11/21 1644   BP: 130/76   Temp: 98 °F (36.7 °C)   Weight: 99.3 kg (218 lb 14.4 oz)   Height: 180.3 cm (71\")     Body mass index is 30.53 kg/m².  Physical Exam  Vitals reviewed.   Constitutional:       Appearance: He is well-developed.   HENT:      Head: Normocephalic and atraumatic.   Cardiovascular:      Rate and Rhythm: Normal rate and regular rhythm.      Heart sounds: Normal heart sounds, S1 normal and S2 normal.   Pulmonary:      Effort: Pulmonary effort is normal.      Breath sounds: Normal breath sounds.   Skin:     General: Skin is warm.   Neurological:      Mental Status: He is alert.      Comments: He knows the year, month, day, date, season, floor, city, state, name of campus.    Psychiatric:        "  Behavior: Behavior normal.         Procedures    Assessment/Plan   Diagnoses and all orders for this visit:    1. Essential hypertension (Primary)  -     Basic Metabolic Panel; Future    2. Forgetfulness  Comments:  Discussed getting formal memory testing at [facility]. He wants to wait at this point and I think that is reasonable. He will call for changes.  Orders:  -     TSH  -     Vitamin B12             He is losing weight on purpose. BP is good. Encouraged him to get more sleep. Recjase Pichardo.    Return in about 6 months (around 12/11/2021) for Lab Before FUP.

## 2021-12-17 ENCOUNTER — OFFICE VISIT (OUTPATIENT)
Dept: INTERNAL MEDICINE | Facility: CLINIC | Age: 61
End: 2021-12-17

## 2021-12-17 VITALS
SYSTOLIC BLOOD PRESSURE: 116 MMHG | BODY MASS INDEX: 32.2 KG/M2 | DIASTOLIC BLOOD PRESSURE: 82 MMHG | HEIGHT: 71 IN | TEMPERATURE: 97.5 F | WEIGHT: 230 LBS

## 2021-12-17 DIAGNOSIS — I10 ESSENTIAL HYPERTENSION: Primary | Chronic | ICD-10-CM

## 2021-12-17 DIAGNOSIS — E78.49 OTHER HYPERLIPIDEMIA: Chronic | ICD-10-CM

## 2021-12-17 DIAGNOSIS — G47.33 OSA (OBSTRUCTIVE SLEEP APNEA): ICD-10-CM

## 2021-12-17 DIAGNOSIS — Z12.5 PROSTATE CANCER SCREENING: ICD-10-CM

## 2021-12-17 PROCEDURE — 99214 OFFICE O/P EST MOD 30 MIN: CPT | Performed by: INTERNAL MEDICINE

## 2021-12-17 RX ORDER — ATORVASTATIN CALCIUM 20 MG/1
20 TABLET, FILM COATED ORAL DAILY
Qty: 90 TABLET | Refills: 3 | Status: SHIPPED | OUTPATIENT
Start: 2021-12-17 | End: 2022-04-06 | Stop reason: SDUPTHER

## 2021-12-17 RX ORDER — HYDROCHLOROTHIAZIDE 25 MG/1
25 TABLET ORAL DAILY
Qty: 90 TABLET | Refills: 3 | Status: SHIPPED | OUTPATIENT
Start: 2021-12-17 | End: 2022-04-06 | Stop reason: SDUPTHER

## 2021-12-17 RX ORDER — METOPROLOL SUCCINATE 100 MG/1
100 TABLET, EXTENDED RELEASE ORAL DAILY
Qty: 90 TABLET | Refills: 3 | Status: SHIPPED | OUTPATIENT
Start: 2021-12-17 | End: 2022-04-06 | Stop reason: SDUPTHER

## 2021-12-17 RX ORDER — LOSARTAN POTASSIUM 100 MG/1
100 TABLET ORAL DAILY
Qty: 90 TABLET | Refills: 3 | Status: SHIPPED | OUTPATIENT
Start: 2021-12-17 | End: 2022-04-06 | Stop reason: SDUPTHER

## 2021-12-17 NOTE — PROGRESS NOTES
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 He exercises 30 minutes 5 days per week on treadmill. He denies chest pain or dyspnea. He uses a CPAP machine daily.   Allergies   Allergen Reactions   • Avelox [Moxifloxacin Hcl] Rash       Current Outpatient Medications on File Prior to Visit   Medication Sig Dispense Refill   • ketoconazole (NIZORAL) 2 % shampoo   0   • [DISCONTINUED] atorvastatin (LIPITOR) 20 MG tablet Take 1 tablet by mouth Daily. 90 tablet 3   • [DISCONTINUED] hydroCHLOROthiazide (HYDRODIURIL) 25 MG tablet Take 1 tablet by mouth Daily. 90 tablet 3   • [DISCONTINUED] losartan (COZAAR) 100 MG tablet Take 1 tablet by mouth Daily. 90 tablet 3   • [DISCONTINUED] metoprolol succinate XL (Toprol XL) 100 MG 24 hr tablet Take 1 tablet by mouth Daily. 90 tablet 3     No current facility-administered medications on file prior to visit.       Past Medical History:   Diagnosis Date   • Cellulitis of lower extremity    • Chronic venous hypertension with complication involving both sides    • Decreased libido    • Eczema    • Leg swelling    • Low testosterone level in male    • Nocturia    • Obesity (BMI 30-39.9)    • Tubular adenoma of colon 03/15/2019    X2   • Varicose veins of legs        Past Surgical History:   Procedure Laterality Date   • COLONOSCOPY  03/15/2019    dr menjivar   • INGUINAL HERNIA REPAIR Bilateral    • LASIK     • TONSILLECTOMY AND ADENOIDECTOMY         Family History   Problem Relation Age of Onset   •  "Pneumonia Mother    • Dementia Mother    • Heart failure Father    • Hypertension Father    • Hypertension Brother        Social History     Socioeconomic History   • Marital status:    Tobacco Use   • Smoking status: Never Smoker   • Smokeless tobacco: Never Used   Substance and Sexual Activity   • Alcohol use: Yes     Comment: rare   • Drug use: No   • Sexual activity: Defer           The following portions of the patient's history were reviewed and updated as appropriate: problem list, allergies, current medications, past medical history, past family history, past social history and past surgical history.    Review of Systems    Immunization History   Administered Date(s) Administered   • COVID-19 (PFIZER) 12/28/2020, 01/18/2021, 10/08/2021   • Flu Vaccine Intradermal Quad 18-64YR 10/18/2018, 10/18/2021   • Flu Vaccine Quad PF >36MO 10/18/2018, 10/29/2019   • Hepatitis A 05/22/2018, 12/27/2018   • Shingrix 08/04/2021, 10/12/2021   • Td 05/23/2009   • Tdap 06/19/2019       Objective   Vitals:    12/17/21 0752   BP: 116/82   Temp: 97.5 °F (36.4 °C)   Weight: 104 kg (230 lb)   Height: 180.3 cm (70.98\")     Body mass index is 32.09 kg/m².  Physical Exam  Vitals reviewed.   Constitutional:       Appearance: He is well-developed.   HENT:      Head: Normocephalic and atraumatic.   Cardiovascular:      Rate and Rhythm: Normal rate and regular rhythm.      Heart sounds: Normal heart sounds, S1 normal and S2 normal.   Pulmonary:      Effort: Pulmonary effort is normal.      Breath sounds: Normal breath sounds.   Skin:     General: Skin is warm.   Neurological:      Mental Status: He is alert.   Psychiatric:         Behavior: Behavior normal.         Procedures    Assessment/Plan   Diagnoses and all orders for this visit:    1. Essential hypertension (Primary)  -     hydroCHLOROthiazide (HYDRODIURIL) 25 MG tablet; Take 1 tablet by mouth Daily.  Dispense: 90 tablet; Refill: 3  -     losartan (COZAAR) 100 MG tablet; " Take 1 tablet by mouth Daily.  Dispense: 90 tablet; Refill: 3  -     metoprolol succinate XL (Toprol XL) 100 MG 24 hr tablet; Take 1 tablet by mouth Daily.  Dispense: 90 tablet; Refill: 3  -     Comprehensive Metabolic Panel; Future    2. Other hyperlipidemia  -     atorvastatin (LIPITOR) 20 MG tablet; Take 1 tablet by mouth Daily.  Dispense: 90 tablet; Refill: 3    3. ROBERT (obstructive sleep apnea)    4. Prostate cancer screening  -     PSA Screen; Future               Reviewed cbc, cmp, and FLP. BP is good. He uses his CPAP. Immunizations are UTD.  Return in about 6 months (around 6/17/2022) for Annual physical, Lab Before FUP.

## 2022-04-06 DIAGNOSIS — E78.49 OTHER HYPERLIPIDEMIA: Chronic | ICD-10-CM

## 2022-04-06 DIAGNOSIS — I10 ESSENTIAL HYPERTENSION: Chronic | ICD-10-CM

## 2022-04-06 RX ORDER — METOPROLOL SUCCINATE 100 MG/1
100 TABLET, EXTENDED RELEASE ORAL DAILY
Qty: 90 TABLET | Refills: 3 | Status: SHIPPED | OUTPATIENT
Start: 2022-04-06 | End: 2022-07-15 | Stop reason: SDUPTHER

## 2022-04-06 RX ORDER — HYDROCHLOROTHIAZIDE 25 MG/1
25 TABLET ORAL DAILY
Qty: 90 TABLET | Refills: 3 | Status: SHIPPED | OUTPATIENT
Start: 2022-04-06 | End: 2022-07-15 | Stop reason: SDUPTHER

## 2022-04-06 RX ORDER — LOSARTAN POTASSIUM 100 MG/1
100 TABLET ORAL DAILY
Qty: 90 TABLET | Refills: 3 | Status: SHIPPED | OUTPATIENT
Start: 2022-04-06 | End: 2022-07-15 | Stop reason: SDUPTHER

## 2022-04-06 RX ORDER — ATORVASTATIN CALCIUM 20 MG/1
20 TABLET, FILM COATED ORAL DAILY
Qty: 90 TABLET | Refills: 3 | Status: SHIPPED | OUTPATIENT
Start: 2022-04-06 | End: 2022-07-15 | Stop reason: SDUPTHER

## 2022-04-06 NOTE — TELEPHONE ENCOUNTER
Caller: New Krause MD    Relationship: Self    Best call back number: [phone]    Requested Prescriptions:   Requested Prescriptions     Pending Prescriptions Disp Refills   • metoprolol succinate XL (Toprol XL) 100 MG 24 hr tablet 90 tablet 3     Sig: Take 1 tablet by mouth Daily.   • losartan (COZAAR) 100 MG tablet 90 tablet 3     Sig: Take 1 tablet by mouth Daily.   • hydroCHLOROthiazide (HYDRODIURIL) 25 MG tablet 90 tablet 3     Sig: Take 1 tablet by mouth Daily.   • atorvastatin (LIPITOR) 20 MG tablet 90 tablet 3     Sig: Take 1 tablet by mouth Daily.        Pharmacy where request should be sent: [facility] - [facility] [address] - [phone]  - [phone]      Additional details provided by patient: ASKING FOR 90 DAY SUPPLY     Does the patient have less than a 3 day supply:  [] Yes  [x] No    Toña Baires, RegFlor Rep   04/06/22 15:51 EDT

## 2022-07-15 ENCOUNTER — OFFICE VISIT (OUTPATIENT)
Dept: INTERNAL MEDICINE | Facility: CLINIC | Age: 62
End: 2022-07-15

## 2022-07-15 VITALS
DIASTOLIC BLOOD PRESSURE: 70 MMHG | BODY MASS INDEX: 33.04 KG/M2 | HEIGHT: 71 IN | WEIGHT: 236 LBS | TEMPERATURE: 97.8 F | SYSTOLIC BLOOD PRESSURE: 130 MMHG

## 2022-07-15 DIAGNOSIS — I10 ESSENTIAL HYPERTENSION: Chronic | ICD-10-CM

## 2022-07-15 DIAGNOSIS — E78.49 OTHER HYPERLIPIDEMIA: Chronic | ICD-10-CM

## 2022-07-15 DIAGNOSIS — R68.89 FORGETFULNESS: ICD-10-CM

## 2022-07-15 DIAGNOSIS — G47.33 OSA (OBSTRUCTIVE SLEEP APNEA): ICD-10-CM

## 2022-07-15 DIAGNOSIS — Z00.00 WELLNESS EXAMINATION: Primary | ICD-10-CM

## 2022-07-15 PROCEDURE — 99396 PREV VISIT EST AGE 40-64: CPT | Performed by: INTERNAL MEDICINE

## 2022-07-15 RX ORDER — METOPROLOL SUCCINATE 100 MG/1
100 TABLET, EXTENDED RELEASE ORAL DAILY
Qty: 90 TABLET | Refills: 3 | Status: SHIPPED | OUTPATIENT
Start: 2022-07-15

## 2022-07-15 RX ORDER — HYDROCHLOROTHIAZIDE 25 MG/1
25 TABLET ORAL DAILY
Qty: 90 TABLET | Refills: 3 | Status: SHIPPED | OUTPATIENT
Start: 2022-07-15

## 2022-07-15 RX ORDER — LOSARTAN POTASSIUM 100 MG/1
100 TABLET ORAL DAILY
Qty: 90 TABLET | Refills: 3 | Status: SHIPPED | OUTPATIENT
Start: 2022-07-15

## 2022-07-15 RX ORDER — ATORVASTATIN CALCIUM 20 MG/1
20 TABLET, FILM COATED ORAL DAILY
Qty: 90 TABLET | Refills: 3 | Status: SHIPPED | OUTPATIENT
Start: 2022-07-15

## 2022-07-15 NOTE — PROGRESS NOTES
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 His BP has been 130/70-80. He uses his CPAP 5 hours per night. He denies chest pain, dyspnea, melena or hematochezia. His scribe says he repeats himself sometimes. He has not made any errors. He has not had any headaches. He has to look up med names sometimes. He is not depressed.  Allergies   Allergen Reactions   • Avelox [Moxifloxacin Hcl] Rash       Current Outpatient Medications on File Prior to Visit   Medication Sig Dispense Refill   • ketoconazole (NIZORAL) 2 % shampoo   0   • [DISCONTINUED] atorvastatin (LIPITOR) 20 MG tablet Take 1 tablet by mouth Daily. 90 tablet 3   • [DISCONTINUED] hydroCHLOROthiazide (HYDRODIURIL) 25 MG tablet Take 1 tablet by mouth Daily. 90 tablet 3   • [DISCONTINUED] losartan (COZAAR) 100 MG tablet Take 1 tablet by mouth Daily. 90 tablet 3   • [DISCONTINUED] metoprolol succinate XL (Toprol XL) 100 MG 24 hr tablet Take 1 tablet by mouth Daily. 90 tablet 3     No current facility-administered medications on file prior to visit.       Past Medical History:   Diagnosis Date   • Cellulitis of lower extremity    • Chronic venous hypertension with complication involving both sides    • Decreased libido    • Eczema    • Leg swelling    • Low testosterone level in male    • Nocturia    • Obesity (BMI 30-39.9)    • Tubular adenoma of colon 03/15/2019    X2   • Varicose veins of legs        Past Surgical  History:   Procedure Laterality Date   • COLONOSCOPY  03/15/2019    dr menjivar   • INGUINAL HERNIA REPAIR Bilateral    • LASIK     • TONSILLECTOMY AND ADENOIDECTOMY         Family History   Problem Relation Age of Onset   • Pneumonia Mother    • Dementia Mother    • Heart failure Father    • Hypertension Father    • Hypertension Brother        Social History     Socioeconomic History   • Marital status:    Tobacco Use   • Smoking status: Never Smoker   • Smokeless tobacco: Never Used   Substance and Sexual Activity   • Alcohol use: Yes     Comment: rare   • Drug use: No   • Sexual activity: Defer           The following portions of the patient's history were reviewed and updated as appropriate: problem list, allergies, current medications, past medical history, past family history, past social history and past surgical history.    Review of Systems   Constitutional: Negative for chills, fever and unexpected weight loss.   HENT: Negative for postnasal drip and sore throat.    Eyes: Negative for blurred vision.   Respiratory: Negative for cough, shortness of breath and wheezing.    Cardiovascular: Negative for chest pain and leg swelling.   Gastrointestinal: Negative for abdominal pain, blood in stool, nausea, vomiting and GERD.   Endocrine: Negative for polyuria.   Genitourinary: Negative for dysuria, frequency and hematuria.   Musculoskeletal: Negative for gait problem.   Skin: Negative for rash.   Allergic/Immunologic: Negative for immunocompromised state.   Neurological: Negative for weakness.   Hematological: Does not bruise/bleed easily.   Psychiatric/Behavioral: Negative for depressed mood. The patient is not nervous/anxious.        Immunization History   Administered Date(s) Administered   • COVID-19 (PFIZER) PURPLE CAP 12/28/2020, 01/18/2021, 10/08/2021   • Covid-19 (Pfizer) Gray Cap 04/15/2022   • Flu Vaccine Intradermal Quad 18-64YR 10/18/2018, 10/18/2021   • Flu Vaccine Quad PF >36MO 10/18/2018,  "10/29/2019   • Hepatitis A 05/22/2018, 12/27/2018   • Shingrix 08/04/2021, 10/12/2021   • Td 05/23/2009   • Tdap 06/19/2019       Objective   Vitals:    07/15/22 0823   BP: 130/70   Temp: 97.8 °F (36.6 °C)   Weight: 107 kg (236 lb)   Height: 180.3 cm (70.98\")     Body mass index is 32.93 kg/m².  Physical Exam  Vitals reviewed.   Constitutional:       Appearance: He is well-developed.   HENT:      Head: Normocephalic and atraumatic.      Mouth/Throat:      Mouth: Mucous membranes are moist.      Pharynx: Oropharynx is clear.   Eyes:      Extraocular Movements: Extraocular movements intact.      Conjunctiva/sclera: Conjunctivae normal.      Pupils: Pupils are equal, round, and reactive to light.   Neck:      Thyroid: No thyromegaly.      Vascular: No carotid bruit.   Cardiovascular:      Rate and Rhythm: Normal rate and regular rhythm.      Heart sounds: Normal heart sounds. No murmur heard.  Pulmonary:      Effort: Pulmonary effort is normal.      Breath sounds: Normal breath sounds.   Abdominal:      General: There is no distension.      Palpations: Abdomen is soft. There is no mass.      Tenderness: There is no abdominal tenderness. There is no rebound.   Musculoskeletal:      Cervical back: Neck supple.   Lymphadenopathy:      Cervical: No cervical adenopathy.   Skin:     General: Skin is warm.   Neurological:      Mental Status: He is alert.      Comments: MMSE 30/30   Psychiatric:         Behavior: Behavior normal.         Procedures    Assessment & Plan   Diagnoses and all orders for this visit:    1. Wellness examination (Primary)    2. Essential hypertension  -     Basic Metabolic Panel; Future  -     hydroCHLOROthiazide (HYDRODIURIL) 25 MG tablet; Take 1 tablet by mouth Daily.  Dispense: 90 tablet; Refill: 3  -     losartan (COZAAR) 100 MG tablet; Take 1 tablet by mouth Daily.  Dispense: 90 tablet; Refill: 3  -     metoprolol succinate XL (Toprol XL) 100 MG 24 hr tablet; Take 1 tablet by mouth Daily.  " Dispense: 90 tablet; Refill: 3    3. Other hyperlipidemia  -     atorvastatin (LIPITOR) 20 MG tablet; Take 1 tablet by mouth Daily.  Dispense: 90 tablet; Refill: 3    4. ROBERT (obstructive sleep apnea)    5. Forgetfulness  Comments:  Nl B12 and TSH last year. Great MMSE score. I will have neuropsych eval.  Orders:  -     Ambulatory Referral to Neuropsychology               Reviewed labs. Cscope and immunizations are UTD. BP is good. He exercises 150 minutes per week. He uses his CPAP. LDL is at goal. Repeat sodium with next labs.  Return in about 6 months (around 1/15/2023), or 30 minutes, for Lab Before FUP.

## 2022-12-30 ENCOUNTER — TELEPHONE (OUTPATIENT)
Dept: INTERNAL MEDICINE | Facility: CLINIC | Age: 62
End: 2022-12-30

## 2022-12-30 NOTE — TELEPHONE ENCOUNTER
Caller: New Krause MD    Relationship: Self    Best call back number: [phone]    Caller requesting test results: PATIENT    What test was performed: NEUROLOGICAL TESTS    When was the test performed: NOT STATED    Where was the test performed: BY DR. MANZANO    Additional notes: PATIENT WOULD LIKE TO KNOW IF DR. MUNIZ HAS RECEIVED HIS TEST RESULTS FROM DR. MANZANO.  PLEASE CALL PATIENT BACK TO DISCUSS TEST RESULTS.    PLEASE ADVISE.

## 2022-12-30 NOTE — TELEPHONE ENCOUNTER
Let him know I had not seen result until he notified me. I would like to see him next week in the office. I can see next Thursday at 11:30 for 30 minutes. Please confirm that spot is open

## 2023-01-05 ENCOUNTER — OFFICE VISIT (OUTPATIENT)
Dept: INTERNAL MEDICINE | Facility: CLINIC | Age: 63
End: 2023-01-05
Payer: COMMERCIAL

## 2023-01-05 VITALS
HEIGHT: 71 IN | SYSTOLIC BLOOD PRESSURE: 132 MMHG | DIASTOLIC BLOOD PRESSURE: 82 MMHG | HEART RATE: 86 BPM | OXYGEN SATURATION: 96 % | WEIGHT: 239 LBS | BODY MASS INDEX: 33.46 KG/M2

## 2023-01-05 DIAGNOSIS — I10 ESSENTIAL HYPERTENSION: Chronic | ICD-10-CM

## 2023-01-05 DIAGNOSIS — G47.33 OSA (OBSTRUCTIVE SLEEP APNEA): ICD-10-CM

## 2023-01-05 DIAGNOSIS — G31.84 MILD COGNITIVE IMPAIRMENT: Primary | Chronic | ICD-10-CM

## 2023-01-05 PROCEDURE — 99215 OFFICE O/P EST HI 40 MIN: CPT | Performed by: INTERNAL MEDICINE

## 2023-01-05 RX ORDER — DONEPEZIL HYDROCHLORIDE 5 MG/1
5 TABLET, FILM COATED ORAL NIGHTLY
Qty: 30 TABLET | Refills: 0 | Status: SHIPPED | OUTPATIENT
Start: 2023-01-05 | End: 2023-03-17

## 2023-01-05 RX ORDER — DONEPEZIL HYDROCHLORIDE 10 MG/1
10 TABLET, FILM COATED ORAL NIGHTLY
Qty: 30 TABLET | Refills: 2 | Status: SHIPPED | OUTPATIENT
Start: 2023-01-05 | End: 2023-03-17 | Stop reason: SDUPTHER

## 2023-01-05 NOTE — PROGRESS NOTES
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 This started after his scribe felt that he was repeating himself more and he was having a harder time recalling names of medications. His wife is present today and she says he does not any have any confusion or changes in behavior.  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 He is not aware of any mistakes.  He only feels anxious if he gets behind. He uses his CPAP nightly. He only sleeps 4.5 to 5 hours per night. His mother had dementia diagnosed in her mid to late 70s. He denies headaches or slurred speech.  Allergies   Allergen Reactions   • Avelox [Moxifloxacin Hcl] Rash       Current Outpatient Medications on File Prior to Visit   Medication Sig Dispense Refill   • atorvastatin (LIPITOR) 20 MG tablet Take 1 tablet by mouth Daily. 90 tablet 3   • hydroCHLOROthiazide (HYDRODIURIL) 25 MG tablet Take 1 tablet by mouth Daily. 90 tablet 3   • ketoconazole (NIZORAL) 2 % shampoo   0   • losartan (COZAAR) 100 MG tablet Take 1 tablet by mouth Daily. 90 tablet 3   • metoprolol succinate XL (Toprol XL) 100 MG 24 hr tablet Take 1 tablet by mouth Daily. 90 tablet 3     No current facility-administered medications on file prior to visit.       Past Medical History:   Diagnosis Date   • Cellulitis of lower extremity    • Chronic venous hypertension with complication involving both sides    • Decreased libido    • Eczema    •  Leg swelling    • Low testosterone level in male    • Nocturia    • Obesity (BMI 30-39.9)    • Tubular adenoma of colon 03/15/2019    X2   • Varicose veins of legs        Past Surgical History:   Procedure Laterality Date   • COLONOSCOPY  03/15/2019    dr menjivar   • INGUINAL HERNIA REPAIR Bilateral    • LASIK     • TONSILLECTOMY AND ADENOIDECTOMY         Family History   Problem Relation Age of Onset   • Pneumonia Mother    • Dementia Mother    • Heart failure Father    • Hypertension Father    • Hypertension Brother        Social History     Socioeconomic History   • Marital status:    Tobacco Use   • Smoking status: Never   • Smokeless tobacco: Never   Substance and Sexual Activity   • Alcohol use: Yes     Comment: rare   • Drug use: No   • Sexual activity: Defer           The following portions of the patient's history were reviewed and updated as appropriate: problem list, allergies, current medications, past medical history, past family history, past social history and past surgical history.    Review of Systems    Immunization History   Administered Date(s) Administered   • COVID-19 (PFIZER) PURPLE CAP 12/28/2020, 01/18/2021, 10/08/2021   • Covid-19 (Pfizer) Gray Cap 04/15/2022, 11/04/2022   • Flu Vaccine Intradermal Quad 18-64YR 10/18/2018, 10/18/2021, 12/02/2022   • Flu Vaccine Quad PF >36MO 10/18/2018, 10/29/2019   • Hepatitis A 05/22/2018, 12/27/2018   • Shingrix 08/04/2021, 10/12/2021   • Td 05/23/2009   • Tdap 06/19/2019       Objective   Vitals:    01/05/23 1136   BP: 132/82   Pulse: 86   SpO2: 96%   Weight: 108 kg (239 lb)   Height: 180.3 cm (70.98\")     Body mass index is 33.35 kg/m².  Physical Exam  Vitals reviewed.   Constitutional:       Appearance: He is well-developed.   HENT:      Head: Normocephalic and atraumatic.   Cardiovascular:      Rate and Rhythm: Normal rate and regular rhythm.      Heart sounds: Normal heart sounds, S1 normal and S2 normal.   Pulmonary:      Effort: Pulmonary  effort is normal.      Breath sounds: Normal breath sounds.   Skin:     General: Skin is warm.   Neurological:      Mental Status: He is alert.      Comments: CN II-XII intact except did not check VIII   Psychiatric:         Behavior: Behavior normal.         Procedures    Assessment & Plan   Diagnoses and all orders for this visit:    1. Mild cognitive impairment (Primary)  -     MRI Brain Without Contrast; Future  -     TSH  -     Vitamin B12  -     RPR  -     donepezil (Aricept) 5 MG tablet; Take 1 tablet by mouth Every Night.  Dispense: 30 tablet; Refill: 0  -     donepezil (Aricept) 10 MG tablet; Take 1 tablet by mouth Every Night.  Dispense: 30 tablet; Refill: 2  -     Ambulatory Referral to Neurology    2. ROBERT (obstructive sleep apnea)  Comments:  Uses CPAP    3. Essential hypertension  Comments:  BP on upper limit nl             Reviewed neuropsych note. Get MRI brain and labs. Start Aricept. Discussed implications with practicing medicine so I am placing an urgent referral to  neurology to get their input about work and disability.    41 minutes spent today including chart review same day.  Return in about 2 months (around 3/5/2023) for Annual physical.

## 2023-01-10 ENCOUNTER — TELEPHONE (OUTPATIENT)
Dept: INTERNAL MEDICINE | Facility: CLINIC | Age: 63
End: 2023-01-10
Payer: COMMERCIAL

## 2023-01-10 NOTE — TELEPHONE ENCOUNTER
Dr. Tonya Hoover is wanting to change the patient's order from MRI without to with and without if okay with Dr. Harris, please advise?  [phone]

## 2023-01-11 DIAGNOSIS — G31.84 MILD COGNITIVE IMPAIRMENT: Primary | ICD-10-CM

## 2023-01-13 ENCOUNTER — TELEPHONE (OUTPATIENT)
Dept: INTERNAL MEDICINE | Facility: CLINIC | Age: 63
End: 2023-01-13
Payer: COMMERCIAL

## 2023-01-13 DIAGNOSIS — I10 ESSENTIAL HYPERTENSION: Primary | ICD-10-CM

## 2023-01-13 NOTE — TELEPHONE ENCOUNTER
Sodium low at 131. It may be related to his HCTZ. Mail him order for bmp to get done in his office in two weeks. Tell him to call me day after done so I can review result in computer

## 2023-01-13 NOTE — TELEPHONE ENCOUNTER
Let him know sodium is low. I put in order for bmp in two weeks. Mail him order to get done in his office. Have him call me day after done so I can know to review since results are not getting to me from [facility]

## 2023-03-17 ENCOUNTER — OFFICE VISIT (OUTPATIENT)
Dept: INTERNAL MEDICINE | Facility: CLINIC | Age: 63
End: 2023-03-17
Payer: COMMERCIAL

## 2023-03-17 VITALS
HEIGHT: 71 IN | DIASTOLIC BLOOD PRESSURE: 82 MMHG | WEIGHT: 241 LBS | BODY MASS INDEX: 33.74 KG/M2 | SYSTOLIC BLOOD PRESSURE: 132 MMHG | TEMPERATURE: 97.8 F

## 2023-03-17 DIAGNOSIS — E78.49 OTHER HYPERLIPIDEMIA: Chronic | ICD-10-CM

## 2023-03-17 DIAGNOSIS — I10 ESSENTIAL HYPERTENSION: Primary | Chronic | ICD-10-CM

## 2023-03-17 DIAGNOSIS — G31.84 MILD COGNITIVE IMPAIRMENT: Chronic | ICD-10-CM

## 2023-03-17 PROCEDURE — 99214 OFFICE O/P EST MOD 30 MIN: CPT | Performed by: INTERNAL MEDICINE

## 2023-03-17 RX ORDER — DONEPEZIL HYDROCHLORIDE 10 MG/1
10 TABLET, FILM COATED ORAL NIGHTLY
Qty: 90 TABLET | Refills: 2 | Status: SHIPPED | OUTPATIENT
Start: 2023-03-17

## 2023-03-17 NOTE — PROGRESS NOTES
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 He saw Dr. Denny at . He had a negative EEG. 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 His TRAB was positive but the rest of his testing for Graves is negative. He had an EMG that showed lumbar radiculopathy; he does not have back pain. He is to have an LP to look for abnormal proteins and to check pressure. He is tolerating Aricept except for occasional diarrhea. His last BP at home was 128/72. He does the treadmill 30 minutes 5 days per week. He just started pickle ball.  Allergies   Allergen Reactions   • Avelox [Moxifloxacin Hcl] Rash       Current Outpatient Medications on File Prior to Visit   Medication Sig Dispense Refill   • atorvastatin (LIPITOR) 20 MG tablet Take 1 tablet by mouth Daily. 90 tablet 3   • hydroCHLOROthiazide (HYDRODIURIL) 25 MG tablet Take 1 tablet by mouth Daily. 90 tablet 3   • ketoconazole (NIZORAL) 2 % shampoo   0   • losartan (COZAAR) 100 MG tablet Take 1 tablet by mouth Daily. 90 tablet 3   • metoprolol succinate XL (Toprol XL) 100 MG 24 hr tablet Take 1 tablet by mouth Daily. 90 tablet 3   • [DISCONTINUED] donepezil (Aricept) 10 MG tablet Take 1 tablet by mouth Every Night. 30 tablet 2   • [DISCONTINUED] donepezil (Aricept) 5 MG tablet Take 1 tablet by mouth Every Night. 30 tablet 0     No current facility-administered medications on file prior to visit.       Past Medical History:   Diagnosis Date   • Cellulitis  "of lower extremity    • Chronic venous hypertension with complication involving both sides    • Decreased libido    • Eczema    • Leg swelling    • Low testosterone level in male    • Nocturia    • Obesity (BMI 30-39.9)    • Tubular adenoma of colon 03/15/2019    X2   • Varicose veins of legs        Past Surgical History:   Procedure Laterality Date   • COLONOSCOPY  03/15/2019    dr menjivar   • INGUINAL HERNIA REPAIR Bilateral    • LASIK     • TONSILLECTOMY AND ADENOIDECTOMY         Family History   Problem Relation Age of Onset   • Pneumonia Mother    • Dementia Mother    • Heart failure Father    • Hypertension Father    • Hypertension Brother        Social History     Socioeconomic History   • Marital status:    Tobacco Use   • Smoking status: Never   • Smokeless tobacco: Never   Substance and Sexual Activity   • Alcohol use: Yes     Comment: rare   • Drug use: No   • Sexual activity: Defer           The following portions of the patient's history were reviewed and updated as appropriate: problem list, allergies, current medications, past medical history, past family history, past social history and past surgical history.    Review of Systems    Immunization History   Administered Date(s) Administered   • COVID-19 (PFIZER) PURPLE CAP 12/28/2020, 01/18/2021, 10/08/2021   • Covid-19 (Pfizer) Gray Cap 04/15/2022, 11/04/2022   • Flu Vaccine Intradermal Quad 18-64YR 10/18/2018, 10/18/2021, 12/02/2022   • Flu Vaccine Quad PF >36MO 10/18/2018, 10/29/2019   • Hepatitis A 05/22/2018, 12/27/2018   • Shingrix 08/04/2021, 10/12/2021   • Td 05/23/2009   • Tdap 06/19/2019       Objective   Vitals:    03/17/23 0947   BP: 132/82   Temp: 97.8 °F (36.6 °C)   Weight: 109 kg (241 lb)   Height: 180.3 cm (70.98\")     Body mass index is 33.63 kg/m².  Physical Exam  Vitals reviewed.   Constitutional:       Appearance: He is well-developed.   HENT:      Head: Normocephalic and atraumatic.   Cardiovascular:      Rate and Rhythm: " Normal rate and regular rhythm.      Heart sounds: Normal heart sounds, S1 normal and S2 normal.   Pulmonary:      Effort: Pulmonary effort is normal.      Breath sounds: Normal breath sounds.   Skin:     General: Skin is warm.   Neurological:      Mental Status: He is alert.   Psychiatric:         Behavior: Behavior normal.         Procedures    Assessment & Plan   Diagnoses and all orders for this visit:    1. Essential hypertension (Primary)  -     Basic Metabolic Panel; Future    2. Mild cognitive impairment  -     donepezil (Aricept) 10 MG tablet; Take 1 tablet by mouth Every Night.  Dispense: 90 tablet; Refill: 2    3. Other hyperlipidemia               Reviewed blood from [facility] showing TRAB but rest of thyroid testing being negative. Reviewed cbc, cmp, flp and psa. To have LP. Continue Aricept. LDL is at goal.    I think it is reasonable for his looking into long term disability as had planned to work two more years since his son is a second year medical student.  Return in about 6 months (around 9/17/2023) for Annual physical, Lab Before FUP.

## 2023-08-18 DIAGNOSIS — G31.84 MILD COGNITIVE IMPAIRMENT: Chronic | ICD-10-CM

## 2023-08-18 DIAGNOSIS — I10 ESSENTIAL HYPERTENSION: Chronic | ICD-10-CM

## 2023-08-18 DIAGNOSIS — E78.49 OTHER HYPERLIPIDEMIA: Chronic | ICD-10-CM

## 2023-08-18 RX ORDER — METOPROLOL SUCCINATE 100 MG/1
100 TABLET, EXTENDED RELEASE ORAL DAILY
Qty: 90 TABLET | Refills: 3 | Status: SHIPPED | OUTPATIENT
Start: 2023-08-18

## 2023-08-18 RX ORDER — DONEPEZIL HYDROCHLORIDE 10 MG/1
10 TABLET, FILM COATED ORAL NIGHTLY
Qty: 90 TABLET | Refills: 2 | Status: SHIPPED | OUTPATIENT
Start: 2023-08-18

## 2023-08-18 RX ORDER — HYDROCHLOROTHIAZIDE 25 MG/1
25 TABLET ORAL DAILY
Qty: 90 TABLET | Refills: 3 | Status: SHIPPED | OUTPATIENT
Start: 2023-08-18

## 2023-08-18 RX ORDER — LOSARTAN POTASSIUM 100 MG/1
100 TABLET ORAL DAILY
Qty: 90 TABLET | Refills: 3 | Status: SHIPPED | OUTPATIENT
Start: 2023-08-18

## 2023-08-18 RX ORDER — ATORVASTATIN CALCIUM 20 MG/1
20 TABLET, FILM COATED ORAL DAILY
Qty: 90 TABLET | Refills: 3 | Status: SHIPPED | OUTPATIENT
Start: 2023-08-18

## 2023-08-18 NOTE — TELEPHONE ENCOUNTER
Caller: New Krause MD    Relationship: Self    Best call back number: [phone]    Requested Prescriptions:   Requested Prescriptions     Pending Prescriptions Disp Refills    atorvastatin (LIPITOR) 20 MG tablet 90 tablet 3     Sig: Take 1 tablet by mouth Daily.    metoprolol succinate XL (Toprol XL) 100 MG 24 hr tablet 90 tablet 3     Sig: Take 1 tablet by mouth Daily.    hydroCHLOROthiazide (HYDRODIURIL) 25 MG tablet 90 tablet 3     Sig: Take 1 tablet by mouth Daily.    losartan (COZAAR) 100 MG tablet 90 tablet 3     Sig: Take 1 tablet by mouth Daily.    donepezil (Aricept) 10 MG tablet 90 tablet 2     Sig: Take 1 tablet by mouth Every Night.        Pharm[facility] be sent[facility] - [facility] AT PORTAL TO REGISTERED [facility] SITES - [phone]  - [phone] FX     Last office visit with prescribing clinician: 3/17/2023   Last telemedicine visit with prescribing clinician: Visit date not found   Next office visit with prescribing clinician: 9/26/2023     Additional details provided by patient: PATIENT STATES HE HAS 1 WEEK LEFT ON ALL THESE MEDICATIONS.     Would you like a call back once the refill request has been completed: [x] Yes [] No    If the office needs to give you a call back, can they leave a voicemail: [x] Yes [] No    Rita Avery, THO   08/18/23 08:26 EDT

## 2023-09-08 DIAGNOSIS — E78.49 OTHER HYPERLIPIDEMIA: Primary | ICD-10-CM

## 2023-09-08 DIAGNOSIS — Z12.5 PROSTATE CANCER SCREENING: ICD-10-CM

## 2023-09-19 LAB
ALBUMIN SERPL-MCNC: 4.6 G/DL (ref 3.5–5.2)
ALBUMIN/GLOB SERPL: 2.4 G/DL
ALP SERPL-CCNC: 52 U/L (ref 39–117)
ALT SERPL-CCNC: 29 U/L (ref 1–41)
AST SERPL-CCNC: 33 U/L (ref 1–40)
BILIRUB SERPL-MCNC: 0.6 MG/DL (ref 0–1.2)
BUN SERPL-MCNC: 22 MG/DL (ref 8–23)
BUN/CREAT SERPL: 20 (ref 7–25)
CALCIUM SERPL-MCNC: 9.4 MG/DL (ref 8.6–10.5)
CHLORIDE SERPL-SCNC: 98 MMOL/L (ref 98–107)
CHOLEST SERPL-MCNC: 144 MG/DL (ref 0–200)
CHOLEST/HDLC SERPL: 2.18 {RATIO}
CO2 SERPL-SCNC: 30.8 MMOL/L (ref 22–29)
CREAT SERPL-MCNC: 1.1 MG/DL (ref 0.76–1.27)
EGFRCR SERPLBLD CKD-EPI 2021: 74.5 ML/MIN/1.73
GLOBULIN SER CALC-MCNC: 1.9 GM/DL
GLUCOSE SERPL-MCNC: 86 MG/DL (ref 65–99)
HDLC SERPL-MCNC: 66 MG/DL (ref 40–60)
LDLC SERPL CALC-MCNC: 66 MG/DL (ref 0–100)
POTASSIUM SERPL-SCNC: 3.6 MMOL/L (ref 3.5–5.2)
PROT SERPL-MCNC: 6.5 G/DL (ref 6–8.5)
PSA SERPL-MCNC: 1.15 NG/ML (ref 0–4)
SODIUM SERPL-SCNC: 139 MMOL/L (ref 136–145)
TRIGL SERPL-MCNC: 56 MG/DL (ref 0–150)
VLDLC SERPL CALC-MCNC: 12 MG/DL (ref 5–40)

## 2023-09-26 ENCOUNTER — OFFICE VISIT (OUTPATIENT)
Dept: INTERNAL MEDICINE | Facility: CLINIC | Age: 63
End: 2023-09-26
Payer: COMMERCIAL

## 2023-09-26 VITALS
DIASTOLIC BLOOD PRESSURE: 80 MMHG | TEMPERATURE: 97.5 F | HEIGHT: 71 IN | WEIGHT: 240.4 LBS | BODY MASS INDEX: 33.65 KG/M2 | SYSTOLIC BLOOD PRESSURE: 120 MMHG

## 2023-09-26 DIAGNOSIS — G31.84 MILD COGNITIVE IMPAIRMENT: Chronic | ICD-10-CM

## 2023-09-26 DIAGNOSIS — I10 ESSENTIAL HYPERTENSION: Chronic | ICD-10-CM

## 2023-09-26 DIAGNOSIS — E78.49 OTHER HYPERLIPIDEMIA: Chronic | ICD-10-CM

## 2023-09-26 DIAGNOSIS — Z23 NEED FOR INFLUENZA VACCINATION: ICD-10-CM

## 2023-09-26 DIAGNOSIS — Z00.00 WELLNESS EXAMINATION: Primary | ICD-10-CM

## 2023-09-26 PROBLEM — H43.819 VITREOUS DETACHMENT: Status: RESOLVED | Noted: 2019-08-23 | Resolved: 2023-09-26

## 2023-09-26 RX ORDER — METOPROLOL SUCCINATE 100 MG/1
100 TABLET, EXTENDED RELEASE ORAL DAILY
Qty: 90 TABLET | Refills: 3 | Status: SHIPPED | OUTPATIENT
Start: 2023-09-26

## 2023-09-26 RX ORDER — DONEPEZIL HYDROCHLORIDE 10 MG/1
10 TABLET, FILM COATED ORAL NIGHTLY
Qty: 90 TABLET | Refills: 3 | Status: SHIPPED | OUTPATIENT
Start: 2023-09-26

## 2023-09-26 RX ORDER — LOSARTAN POTASSIUM 100 MG/1
100 TABLET ORAL DAILY
Qty: 90 TABLET | Refills: 3 | Status: SHIPPED | OUTPATIENT
Start: 2023-09-26

## 2023-09-26 RX ORDER — HYDROCHLOROTHIAZIDE 25 MG/1
25 TABLET ORAL DAILY
Qty: 90 TABLET | Refills: 3 | Status: SHIPPED | OUTPATIENT
Start: 2023-09-26

## 2023-09-26 RX ORDER — LOSARTAN POTASSIUM 100 MG/1
100 TABLET ORAL DAILY
Qty: 90 TABLET | Refills: 3 | Status: SHIPPED | OUTPATIENT
Start: 2023-09-26 | End: 2023-09-26 | Stop reason: SDUPTHER

## 2023-09-26 RX ORDER — ATORVASTATIN CALCIUM 20 MG/1
20 TABLET, FILM COATED ORAL DAILY
Qty: 90 TABLET | Refills: 3 | Status: SHIPPED | OUTPATIENT
Start: 2023-09-26

## 2023-09-26 RX ORDER — METOPROLOL SUCCINATE 100 MG/1
100 TABLET, EXTENDED RELEASE ORAL DAILY
Qty: 90 TABLET | Refills: 3 | Status: SHIPPED | OUTPATIENT
Start: 2023-09-26 | End: 2023-09-26 | Stop reason: SDUPTHER

## 2023-09-26 RX ORDER — DONEPEZIL HYDROCHLORIDE 10 MG/1
10 TABLET, FILM COATED ORAL NIGHTLY
Qty: 90 TABLET | Refills: 3 | Status: SHIPPED | OUTPATIENT
Start: 2023-09-26 | End: 2023-09-26 | Stop reason: SDUPTHER

## 2023-09-26 RX ORDER — ATORVASTATIN CALCIUM 20 MG/1
20 TABLET, FILM COATED ORAL DAILY
Qty: 90 TABLET | Refills: 3 | Status: SHIPPED | OUTPATIENT
Start: 2023-09-26 | End: 2023-09-26 | Stop reason: SDUPTHER

## 2023-09-26 RX ORDER — HYDROCHLOROTHIAZIDE 25 MG/1
25 TABLET ORAL DAILY
Qty: 90 TABLET | Refills: 3 | Status: SHIPPED | OUTPATIENT
Start: 2023-09-26 | End: 2023-09-26 | Stop reason: SDUPTHER

## 2023-09-26 NOTE — PROGRESS NOTES
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 He has neuropsych to be repeated in December. He plays pickleball. He has not gotten lost or noticed any confusion. He says his thyroid ab were nl about 6 weeks ago. He says his proptosis improved by 1 mm. He denies chest pain, dyspnea, nausea or abdominal pain. His BP runs 120/80s at home. He reports that his LP did not show a protein for Alzheimers.  Allergies   Allergen Reactions    Avelox [Moxifloxacin Hcl] Rash       Current Outpatient Medications on File Prior to Visit   Medication Sig Dispense Refill    ketoconazole (NIZORAL) 2 % shampoo   0    [DISCONTINUED] atorvastatin (LIPITOR) 20 MG tablet Take 1 tablet by mouth Daily. 90 tablet 3    [DISCONTINUED] donepezil (Aricept) 10 MG tablet Take 1 tablet by mouth Every Night. 90 tablet 2    [DISCONTINUED] hydroCHLOROthiazide (HYDRODIURIL) 25 MG tablet Take 1 tablet by mouth Daily. 90 tablet 3    [DISCONTINUED] losartan (COZAAR) 100 MG tablet Take 1 tablet by mouth Daily. 90 tablet 3    [DISCONTINUED] metoprolol succinate XL (Toprol XL) 100 MG 24 hr tablet Take 1 tablet by mouth Daily. 90 tablet 3     No current facility-administered medications on file prior to visit.     BMI is >= 30 and <35. (Class 1 Obesity). The following options were offered after discussion;: exercise counseling/recommendations   Past Medical History:   Diagnosis Date    Cellulitis of lower extremity     Chronic venous hypertension with complication involving both sides     Decreased libido     Eczema     Leg swelling     Low testosterone level in male     Nocturia   "   Obesity (BMI 30-39.9)     Tubular adenoma of colon 03/15/2019    X2    Varicose veins of legs     Vitreous detachment 08/23/2019       Past Surgical History:   Procedure Laterality Date    COLONOSCOPY  03/15/2019    dr menjivar    INGUINAL HERNIA REPAIR Bilateral     LASIK      TONSILLECTOMY AND ADENOIDECTOMY         Family History   Problem Relation Age of Onset    Pneumonia Mother     Dementia Mother     Heart failure Father     Hypertension Father     Hypertension Brother        Social History     Socioeconomic History    Marital status:    Tobacco Use    Smoking status: Never    Smokeless tobacco: Never   Substance and Sexual Activity    Alcohol use: Yes     Comment: rare    Drug use: No    Sexual activity: Defer           The following portions of the patient's history were reviewed and updated as appropriate: problem list, allergies, current medications, past medical history, past family history, past social history, and past surgical history.    Review of Systems    Immunization History   Administered Date(s) Administered    COVID-19 (PFIZER) Purple Cap Monovalent 12/28/2020, 01/18/2021, 10/08/2021    Covid-19 (Pfizer) Gray Cap Monovalent 04/15/2022, 11/04/2022    Flu Vaccine Intradermal Quad 18-64YR 10/18/2018, 10/18/2021, 12/02/2022    Flu Vaccine Quad PF >36MO 10/18/2018, 10/29/2019    Hepatitis A 05/22/2018, 12/27/2018    Shingrix 08/04/2021, 10/12/2021    Td (TDVAX) 05/23/2009    Tdap 06/19/2019       Objective   Vitals:    09/26/23 1312   BP: 120/80   Temp: 97.5 °F (36.4 °C)   Weight: 109 kg (240 lb 6.4 oz)   Height: 180.3 cm (70.98\")     Body mass index is 33.54 kg/m².  Physical Exam  Vitals reviewed.   Constitutional:       Appearance: He is well-developed.   HENT:      Head: Normocephalic and atraumatic.      Mouth/Throat:      Mouth: Mucous membranes are moist.      Pharynx: Oropharynx is clear.   Eyes:      Extraocular Movements: Extraocular movements intact.      Conjunctiva/sclera: " Conjunctivae normal.      Pupils: Pupils are equal, round, and reactive to light.   Neck:      Thyroid: No thyromegaly.      Vascular: No carotid bruit.   Cardiovascular:      Rate and Rhythm: Normal rate and regular rhythm.      Heart sounds: Normal heart sounds. No murmur heard.  Pulmonary:      Effort: Pulmonary effort is normal.      Breath sounds: Normal breath sounds.   Abdominal:      General: There is no distension.      Palpations: Abdomen is soft. There is no mass.      Tenderness: There is no abdominal tenderness. There is no rebound.   Musculoskeletal:      Cervical back: Neck supple.   Lymphadenopathy:      Cervical: No cervical adenopathy.   Skin:     General: Skin is warm.   Neurological:      Mental Status: He is alert.   Psychiatric:         Behavior: Behavior normal.       Procedures    Assessment & Plan   Diagnoses and all orders for this visit:    1. Wellness examination (Primary)    2. Mild cognitive impairment  Comments:  Stable. To have neuropsych repeated in Dec  Orders:  -     donepezil (Aricept) 10 MG tablet; Take 1 tablet by mouth Every Night.  Dispense: 90 tablet; Refill: 3    3. Essential hypertension  -     hydroCHLOROthiazide (HYDRODIURIL) 25 MG tablet; Take 1 tablet by mouth Daily.  Dispense: 90 tablet; Refill: 3  -     losartan (COZAAR) 100 MG tablet; Take 1 tablet by mouth Daily.  Dispense: 90 tablet; Refill: 3  -     metoprolol succinate XL (Toprol XL) 100 MG 24 hr tablet; Take 1 tablet by mouth Daily.  Dispense: 90 tablet; Refill: 3  -     Basic Metabolic Panel; Future    4. Other hyperlipidemia  Comments:  LDL is excellent  Orders:  -     atorvastatin (LIPITOR) 20 MG tablet; Take 1 tablet by mouth Daily.  Dispense: 90 tablet; Refill: 3    5. Other hyperlipidemia  -     atorvastatin (LIPITOR) 20 MG tablet; Take 1 tablet by mouth Daily.  Dispense: 90 tablet; Refill: 3    Other orders  -     Pneumococcal Conjugate Vaccine 20-Valent All               Flu and Prevnar today. He will  need his cscope repeated next year. BP is good. Reviewed labs. LDL is at goal. Recc exercising 150 minutes of exercise per week.     Return in about 6 months (around 3/26/2024) for Lab Before FUP.

## 2023-09-26 NOTE — TELEPHONE ENCOUNTER
Caller: New Krause MD    Relationship: Self    New Krause MD (Self) [phone] (Mobile)         What was the call regarding: PATIENT JUST WANTED TO LET YOU KNOW THAT HE CHANGED PHARMACIES TO [facility]     Is it okay if the provider responds through MyChart:

## 2024-03-26 ENCOUNTER — OFFICE VISIT (OUTPATIENT)
Dept: INTERNAL MEDICINE | Facility: CLINIC | Age: 64
End: 2024-03-26
Payer: COMMERCIAL

## 2024-03-26 VITALS
SYSTOLIC BLOOD PRESSURE: 118 MMHG | HEIGHT: 71 IN | WEIGHT: 241 LBS | BODY MASS INDEX: 33.74 KG/M2 | DIASTOLIC BLOOD PRESSURE: 80 MMHG

## 2024-03-26 DIAGNOSIS — G47.33 OSA (OBSTRUCTIVE SLEEP APNEA): ICD-10-CM

## 2024-03-26 DIAGNOSIS — I10 ESSENTIAL HYPERTENSION: Primary | Chronic | ICD-10-CM

## 2024-03-26 DIAGNOSIS — G31.84 MILD COGNITIVE IMPAIRMENT: Chronic | ICD-10-CM

## 2024-03-26 DIAGNOSIS — D36.9 TUBULAR ADENOMA: ICD-10-CM

## 2024-03-26 DIAGNOSIS — E78.49 OTHER HYPERLIPIDEMIA: ICD-10-CM

## 2024-03-26 DIAGNOSIS — Z12.5 PROSTATE CANCER SCREENING: ICD-10-CM

## 2024-03-26 PROCEDURE — 99214 OFFICE O/P EST MOD 30 MIN: CPT | Performed by: INTERNAL MEDICINE

## 2024-03-26 NOTE — PROGRESS NOTES
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 He had improved but not to the point of being able to go back to work. He is still on disability. He denies chest pain or dyspnea. His BP has been 110-120/70-80.     Allergies   Allergen Reactions    Avelox [Moxifloxacin Hcl] Rash       Current Outpatient Medications on File Prior to Visit   Medication Sig Dispense Refill    atorvastatin (LIPITOR) 20 MG tablet Take 1 tablet by mouth Daily. 90 tablet 3    Cholecalciferol (VITAMIN D-3 PO) Take  by mouth.      Cyanocobalamin (B-12 PO) Take  by mouth.      donepezil (Aricept) 10 MG tablet Take 1 tablet by mouth Every Night. 90 tablet 3    hydroCHLOROthiazide (HYDRODIURIL) 25 MG tablet Take 1 tablet by mouth Daily. 90 tablet 3    ketoconazole (NIZORAL) 2 % shampoo   0    losartan (COZAAR) 100 MG tablet Take 1 tablet by mouth Daily. 90 tablet 3    metoprolol succinate XL (Toprol XL) 100 MG 24 hr tablet Take 1 tablet by mouth Daily. 90 tablet 3     No current facility-administered medications on file prior to visit.       Past Medical History:   Diagnosis Date    Cellulitis of lower extremity     Chronic venous hypertension with complication involving both sides     Decreased libido     Eczema     Leg swelling     Low testosterone level in male     Nocturia     Obesity (BMI 30-39.9)     Tubular adenoma of colon 03/15/2019    X2    Varicose veins of legs     Vitreous detachment 08/23/2019       Past Surgical History:   Procedure Laterality Date    COLONOSCOPY  03/15/2019    dr menjivar    INGUINAL HERNIA REPAIR Bilateral   "   LASIK      TONSILLECTOMY AND ADENOIDECTOMY         Family History   Problem Relation Age of Onset    Pneumonia Mother     Dementia Mother     Heart failure Father     Hypertension Father     Hypertension Brother        Social History     Socioeconomic History    Marital status:    Tobacco Use    Smoking status: Never    Smokeless tobacco: Never   Vaping Use    Vaping status: Never Used   Substance and Sexual Activity    Alcohol use: Yes     Comment: rare    Drug use: No    Sexual activity: Defer           The following portions of the patient's history were reviewed and updated as appropriate: problem list, allergies, current medications, past medical history, past family history, past social history, and past surgical history.    Review of Systems    Immunization History   Administered Date(s) Administered    COVID-19 (PFIZER) Purple Cap Monovalent 12/28/2020, 01/18/2021, 10/08/2021    COVID-19 F23 (PFIZER) 12YRS+ (COMIRNATY) 04/15/2022    Covid-19 (Pfizer) Gray Cap Monovalent 11/04/2022    Flu Vaccine Intradermal Quad 18-64YR 10/18/2018, 10/18/2021, 12/02/2022    Flu Vaccine Quad PF >36MO 10/18/2018, 10/29/2019    Fluzone High-Dose 65+yrs 09/26/2023    Hepatitis A 05/22/2018, 12/27/2018    Pneumococcal Conjugate 20-Valent (PCV20) 09/26/2023    Shingrix 08/04/2021, 10/12/2021    Td (TDVAX) 05/23/2009    Tdap 06/19/2019       Objective   Vitals:    03/26/24 1119   BP: 118/80   Weight: 109 kg (241 lb)   Height: 180.3 cm (70.98\")     Body mass index is 33.63 kg/m².  Physical Exam  Vitals reviewed.   Constitutional:       Appearance: He is well-developed.   HENT:      Head: Normocephalic and atraumatic.   Cardiovascular:      Rate and Rhythm: Normal rate and regular rhythm.      Heart sounds: Normal heart sounds, S1 normal and S2 normal.   Pulmonary:      Effort: Pulmonary effort is normal.      Breath sounds: Normal breath sounds.   Skin:     General: Skin is warm.   Neurological:      Mental Status: He is " alert.   Psychiatric:         Behavior: Behavior normal.         Procedures    Assessment & Plan   Diagnoses and all orders for this visit:    1. Essential hypertension (Primary)  -     Lipid Panel With / Chol / HDL Ratio; Future    2. Mild cognitive impairment  Comments:  States test better but not released to work per [facility]    3. ROBERT (obstructive sleep apnea)  Comments:  Uses cpap.    4. Tubular adenoma  Comments:  He has turned in paper to get cscope    5. Other hyperlipidemia  -     Comprehensive Metabolic Panel; Future    6. Prostate cancer screening  -     PSA Screen; Future                 Reviewed bmp. Get note from [facility]. BP is good.  Return in about 6 months (around 9/26/2024) for Annual physical, Lab Before FUP.

## 2024-04-08 VITALS
SYSTOLIC BLOOD PRESSURE: 126 MMHG | HEART RATE: 61 BPM | SYSTOLIC BLOOD PRESSURE: 119 MMHG | SYSTOLIC BLOOD PRESSURE: 150 MMHG | HEIGHT: 71 IN | WEIGHT: 230 LBS | HEART RATE: 59 BPM | DIASTOLIC BLOOD PRESSURE: 71 MMHG | HEART RATE: 55 BPM | OXYGEN SATURATION: 96 % | DIASTOLIC BLOOD PRESSURE: 65 MMHG | OXYGEN SATURATION: 98 % | DIASTOLIC BLOOD PRESSURE: 74 MMHG | OXYGEN SATURATION: 100 % | SYSTOLIC BLOOD PRESSURE: 147 MMHG | OXYGEN SATURATION: 97 % | HEART RATE: 52 BPM | HEART RATE: 63 BPM | SYSTOLIC BLOOD PRESSURE: 138 MMHG | TEMPERATURE: 96.8 F | RESPIRATION RATE: 25 BRPM | SYSTOLIC BLOOD PRESSURE: 131 MMHG | SYSTOLIC BLOOD PRESSURE: 129 MMHG | RESPIRATION RATE: 24 BRPM | DIASTOLIC BLOOD PRESSURE: 80 MMHG | RESPIRATION RATE: 14 BRPM | RESPIRATION RATE: 20 BRPM | RESPIRATION RATE: 37 BRPM | TEMPERATURE: 97.3 F | RESPIRATION RATE: 16 BRPM | DIASTOLIC BLOOD PRESSURE: 76 MMHG | OXYGEN SATURATION: 99 % | SYSTOLIC BLOOD PRESSURE: 123 MMHG | HEART RATE: 64 BPM | RESPIRATION RATE: 21 BRPM | SYSTOLIC BLOOD PRESSURE: 121 MMHG | HEART RATE: 56 BPM | RESPIRATION RATE: 18 BRPM | SYSTOLIC BLOOD PRESSURE: 125 MMHG | DIASTOLIC BLOOD PRESSURE: 61 MMHG | DIASTOLIC BLOOD PRESSURE: 69 MMHG | DIASTOLIC BLOOD PRESSURE: 64 MMHG | DIASTOLIC BLOOD PRESSURE: 75 MMHG

## 2024-04-09 ENCOUNTER — OFFICE (AMBULATORY)
Dept: URBAN - METROPOLITAN AREA PATHOLOGY 4 | Facility: PATHOLOGY | Age: 64
End: 2024-04-09
Payer: COMMERCIAL

## 2024-04-09 ENCOUNTER — AMBULATORY SURGICAL CENTER (AMBULATORY)
Dept: URBAN - METROPOLITAN AREA SURGERY 17 | Facility: SURGERY | Age: 64
End: 2024-04-09
Payer: COMMERCIAL

## 2024-04-09 DIAGNOSIS — Z86.010 PERSONAL HISTORY OF COLONIC POLYPS: ICD-10-CM

## 2024-04-09 DIAGNOSIS — D12.8 BENIGN NEOPLASM OF RECTUM: ICD-10-CM

## 2024-04-09 DIAGNOSIS — K57.30 DIVERTICULOSIS OF LARGE INTESTINE WITHOUT PERFORATION OR ABS: ICD-10-CM

## 2024-04-09 DIAGNOSIS — D12.4 BENIGN NEOPLASM OF DESCENDING COLON: ICD-10-CM

## 2024-04-09 DIAGNOSIS — D12.2 BENIGN NEOPLASM OF ASCENDING COLON: ICD-10-CM

## 2024-04-09 DIAGNOSIS — Z09 ENCOUNTER FOR FOLLOW-UP EXAMINATION AFTER COMPLETED TREATMEN: ICD-10-CM

## 2024-04-09 PROBLEM — K63.5 POLYP OF COLON: Status: ACTIVE | Noted: 2024-04-09

## 2024-04-09 LAB
GI HISTOLOGY: A. ASCENDING COLON: (no result)
GI HISTOLOGY: B. DESCENDING COLON: (no result)
GI HISTOLOGY: C. RECTUM: (no result)
GI HISTOLOGY: PDF REPORT: (no result)

## 2024-04-09 PROCEDURE — 88305 TISSUE EXAM BY PATHOLOGIST: CPT | Performed by: PATHOLOGY

## 2024-04-09 PROCEDURE — 45385 COLONOSCOPY W/LESION REMOVAL: CPT | Mod: 33 | Performed by: INTERNAL MEDICINE

## 2024-04-09 NOTE — SERVICEHPINOTES
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  Family history is negative for polyps or colon cancer.  He does have a personal history of adenomatous polyps and presents for a follow-up colonoscopy.

## 2024-07-29 DIAGNOSIS — E78.49 OTHER HYPERLIPIDEMIA: Chronic | ICD-10-CM

## 2024-07-29 DIAGNOSIS — G31.84 MILD COGNITIVE IMPAIRMENT: Chronic | ICD-10-CM

## 2024-07-29 DIAGNOSIS — I10 ESSENTIAL HYPERTENSION: Chronic | ICD-10-CM

## 2024-07-29 RX ORDER — METOPROLOL SUCCINATE 100 MG/1
100 TABLET, EXTENDED RELEASE ORAL DAILY
Qty: 90 TABLET | Refills: 3 | Status: SHIPPED | OUTPATIENT
Start: 2024-07-29

## 2024-07-29 RX ORDER — ATORVASTATIN CALCIUM 20 MG/1
20 TABLET, FILM COATED ORAL DAILY
Qty: 90 TABLET | Refills: 3 | Status: SHIPPED | OUTPATIENT
Start: 2024-07-29

## 2024-07-29 RX ORDER — HYDROCHLOROTHIAZIDE 25 MG/1
25 TABLET ORAL DAILY
Qty: 90 TABLET | Refills: 3 | Status: SHIPPED | OUTPATIENT
Start: 2024-07-29

## 2024-07-29 RX ORDER — LOSARTAN POTASSIUM 100 MG/1
100 TABLET ORAL DAILY
Qty: 90 TABLET | Refills: 3 | Status: SHIPPED | OUTPATIENT
Start: 2024-07-29

## 2024-07-29 RX ORDER — DONEPEZIL HYDROCHLORIDE 10 MG/1
10 TABLET, FILM COATED ORAL NIGHTLY
Qty: 90 TABLET | Refills: 3 | Status: SHIPPED | OUTPATIENT
Start: 2024-07-29

## 2024-09-27 ENCOUNTER — OFFICE VISIT (OUTPATIENT)
Dept: INTERNAL MEDICINE | Facility: CLINIC | Age: 64
End: 2024-09-27
Payer: COMMERCIAL

## 2024-09-27 VITALS
BODY MASS INDEX: 34.44 KG/M2 | SYSTOLIC BLOOD PRESSURE: 136 MMHG | HEIGHT: 71 IN | DIASTOLIC BLOOD PRESSURE: 80 MMHG | WEIGHT: 246 LBS

## 2024-09-27 DIAGNOSIS — Z23 NEED FOR INFLUENZA VACCINATION: ICD-10-CM

## 2024-09-27 DIAGNOSIS — E78.00 HIGH CHOLESTEROL: ICD-10-CM

## 2024-09-27 DIAGNOSIS — C4A.9 MERKEL CELL CARCINOMA: ICD-10-CM

## 2024-09-27 DIAGNOSIS — D12.6 TUBULAR ADENOMA OF COLON: ICD-10-CM

## 2024-09-27 DIAGNOSIS — I10 ESSENTIAL HYPERTENSION: Chronic | ICD-10-CM

## 2024-09-27 DIAGNOSIS — Z00.00 WELLNESS EXAMINATION: Primary | ICD-10-CM

## 2024-09-27 DIAGNOSIS — G62.9 NEUROPATHY: ICD-10-CM

## 2024-09-27 PROCEDURE — 99397 PER PM REEVAL EST PAT 65+ YR: CPT | Performed by: INTERNAL MEDICINE

## 2024-10-02 ENCOUNTER — TELEPHONE (OUTPATIENT)
Dept: INTERNAL MEDICINE | Facility: CLINIC | Age: 64
End: 2024-10-02
Payer: COMMERCIAL

## 2024-10-02 NOTE — TELEPHONE ENCOUNTER
"  Caller: New Krause MD \"Kang\"    Relationship: Self    Best call back number: [phone]    What was the call regarding: PATIENT STATES THAT HE CALLED TODAY TO THE [facility] AND THEY SAID THAT THEY WOULD ONLY SEND IF DR. MUNIZ CONTACTED [facility]. PLEASE CONTACT TO REQUEST RECORDS.  "

## 2024-12-05 ENCOUNTER — OFFICE (AMBULATORY)
Age: 64
End: 2024-12-05
Payer: COMMERCIAL

## 2024-12-05 ENCOUNTER — OFFICE (AMBULATORY)
Dept: URBAN - METROPOLITAN AREA CLINIC 76 | Facility: CLINIC | Age: 64
End: 2024-12-05
Payer: COMMERCIAL

## 2024-12-05 VITALS
HEIGHT: 71 IN | OXYGEN SATURATION: 98 % | OXYGEN SATURATION: 98 % | OXYGEN SATURATION: 98 % | HEIGHT: 71 IN | HEIGHT: 71 IN | DIASTOLIC BLOOD PRESSURE: 80 MMHG | WEIGHT: 239 LBS | HEIGHT: 71 IN | HEART RATE: 61 BPM | WEIGHT: 239 LBS | SYSTOLIC BLOOD PRESSURE: 110 MMHG | OXYGEN SATURATION: 98 % | HEIGHT: 71 IN | HEIGHT: 71 IN | HEART RATE: 61 BPM | WEIGHT: 239 LBS | HEART RATE: 61 BPM | SYSTOLIC BLOOD PRESSURE: 110 MMHG | SYSTOLIC BLOOD PRESSURE: 110 MMHG | WEIGHT: 239 LBS | OXYGEN SATURATION: 98 % | OXYGEN SATURATION: 98 % | DIASTOLIC BLOOD PRESSURE: 80 MMHG | OXYGEN SATURATION: 98 % | DIASTOLIC BLOOD PRESSURE: 80 MMHG | WEIGHT: 239 LBS | HEIGHT: 71 IN | DIASTOLIC BLOOD PRESSURE: 80 MMHG | SYSTOLIC BLOOD PRESSURE: 110 MMHG | DIASTOLIC BLOOD PRESSURE: 80 MMHG | HEART RATE: 61 BPM | WEIGHT: 239 LBS | DIASTOLIC BLOOD PRESSURE: 80 MMHG | SYSTOLIC BLOOD PRESSURE: 110 MMHG | HEART RATE: 61 BPM | WEIGHT: 239 LBS | SYSTOLIC BLOOD PRESSURE: 110 MMHG | HEART RATE: 61 BPM | SYSTOLIC BLOOD PRESSURE: 110 MMHG | DIASTOLIC BLOOD PRESSURE: 80 MMHG | HEART RATE: 61 BPM

## 2024-12-05 DIAGNOSIS — Z86.0101 PERSONAL HISTORY OF ADENOMATOUS AND SERRATED COLON POLYPS: ICD-10-CM

## 2024-12-05 DIAGNOSIS — K57.30 DIVERTICULOSIS OF LARGE INTESTINE WITHOUT PERFORATION OR ABS: ICD-10-CM

## 2024-12-05 PROBLEM — Z86.010 PERSONAL HISTORY OF COLONIC POLYPS: Status: ACTIVE | Noted: 2019-03-15

## 2024-12-05 PROBLEM — K62.1 RECTAL POLYP: Status: ACTIVE | Noted: 2019-03-15

## 2024-12-05 PROCEDURE — 99214 OFFICE O/P EST MOD 30 MIN: CPT

## 2024-12-05 RX ORDER — HYDROCORTISONE 25 MG/G
7.5 CREAM TOPICAL
Qty: 30 | Refills: 6 | Status: ACTIVE
Start: 2024-12-05

## 2024-12-05 NOTE — SERVICEHPINOTES
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 Blood is only seen when having a bowel movement. He describes bright red blood on the toilet and the tissue. He reports had an anal fissure approx. 20 years ago but he does not feel the same symptoms this time. He denies constipation, his bowels move once daily " for the most part". His last colonoscopy showed diverticulosis and a daily fiber supplement was recommended, however he is not taking anything currently. He mentioned when wiping does not feel anything protruding, but is not sure about this. brHe denies abdominal pain, bloating or gases. Denies dark stools or mucus. He denies upper alarming GI symptoms. No reflux, nausea, dysphagia, or unintentional weight loss.  korey nair
Family history is negative for polyps or colon cancer. He does have a personal history of adenomatous polyps. br4/09/2024 Colonoscopy-  Polyps in the ascending colon, descending colon and rectum. Mild diverticulosis. Pathology positive for tubular adenoma. Recommended to repeat colonoscopy in 3 years or 2027.korey nair

## 2024-12-05 NOTE — SERVICEHPINOTES
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 Blood is only seen when having a bowel movement. He describes bright red blood on the toilet and the tissue. He reports had an anal fissure approx. 20 years ago but he does not feel the same symptoms this time. He denies constipation, his bowels move once daily " for the most part". His last colonoscopy showed diverticulosis and a daily fiber supplement was recommended, however he is not taking anything currently. He mentioned when wiping does not feel anything protruding, but is not sure about this. brHe denies abdominal pain, bloating or gases. Denies dark stools or mucus. He denies upper alarming GI symptoms. No reflux, nausea, dysphagia, or unintentional weight loss.  cecelia ria
Family history is negative for polyps or colon cancer. He does have a personal history of adenomatous polyps. br4/09/2024 Colonoscopy-  Polyps in the ascending colon, descending colon and rectum. Mild diverticulosis. Pathology positive for tubular adenoma. Recommended to repeat colonoscopy in 3 years or 2027.cecelia rai

## 2024-12-05 NOTE — SERVICEHPINOTES
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 Blood is only seen when having a bowel movement. He describes bright red blood on the toilet and the tissue. He reports had an anal fissure approx. 20 years ago but he does not feel the same symptoms this time. He denies constipation, his bowels move once daily " for the most part". His last colonoscopy showed diverticulosis and a daily fiber supplement was recommended, however he is not taking anything currently. He mentioned when wiping does not feel anything protruding, but is not sure about this. brHe denies abdominal pain, bloating or gases. Denies dark stools or mucus. He denies upper alarming GI symptoms. No reflux, nausea, dysphagia, or unintentional weight loss.  mari guerra
Family history is negative for polyps or colon cancer. He does have a personal history of adenomatous polyps. br4/09/2024 Colonoscopy-  Polyps in the ascending colon, descending colon and rectum. Mild diverticulosis. Pathology positive for tubular adenoma. Recommended to repeat colonoscopy in 3 years or 2027.mari guerra

## 2024-12-05 NOTE — SERVICEHPINOTES
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 Blood is only seen when having a bowel movement. He describes bright red blood on the toilet and the tissue. He reports had an anal fissure approx. 20 years ago but he does not feel the same symptoms this time. He denies constipation, his bowels move once daily " for the most part". His last colonoscopy showed diverticulosis and a daily fiber supplement was recommended, however he is not taking anything currently. He mentioned when wiping does not feel anything protruding, but is not sure about this. brHe denies abdominal pain, bloating or gases. Denies dark stools or mucus. He denies upper alarming GI symptoms. No reflux, nausea, dysphagia, or unintentional weight loss.  zarina srinivasan
Family history is negative for polyps or colon cancer. He does have a personal history of adenomatous polyps. br4/09/2024 Colonoscopy-  Polyps in the ascending colon, descending colon and rectum. Mild diverticulosis. Pathology positive for tubular adenoma. Recommended to repeat colonoscopy in 3 years or 2027.zarina srinivasan

## 2024-12-05 NOTE — SERVICEHPINOTES
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 Blood is only seen when having a bowel movement. He describes bright red blood on the toilet and the tissue. He reports had an anal fissure approx. 20 years ago but he does not feel the same symptoms this time. He denies constipation, his bowels move once daily " for the most part". His last colonoscopy showed diverticulosis and a daily fiber supplement was recommended, however he is not taking anything currently. He mentioned when wiping does not feel anything protruding, but is not sure about this. brHe denies abdominal pain, bloating or gases. Denies dark stools or mucus. He denies upper alarming GI symptoms. No reflux, nausea, dysphagia, or unintentional weight loss.  jorge luis kang
Family history is negative for polyps or colon cancer. He does have a personal history of adenomatous polyps. br4/09/2024 Colonoscopy-  Polyps in the ascending colon, descending colon and rectum. Mild diverticulosis. Pathology positive for tubular adenoma. Recommended to repeat colonoscopy in 3 years or 2027.jorge luis kang

## 2024-12-05 NOTE — SERVICEHPINOTES
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 Blood is only seen when having a bowel movement. He describes bright red blood on the toilet and the tissue. He reports had an anal fissure approx. 20 years ago but he does not feel the same symptoms this time. He denies constipation, his bowels move once daily " for the most part". His last colonoscopy showed diverticulosis and a daily fiber supplement was recommended, however he is not taking anything currently. He mentioned when wiping does not feel anything protruding, but is not sure about this. brHe denies abdominal pain, bloating or gases. Denies dark stools or mucus. He denies upper alarming GI symptoms. No reflux, nausea, dysphagia, or unintentional weight loss.  mariano quintana
Family history is negative for polyps or colon cancer. He does have a personal history of adenomatous polyps. br4/09/2024 Colonoscopy-  Polyps in the ascending colon, descending colon and rectum. Mild diverticulosis. Pathology positive for tubular adenoma. Recommended to repeat colonoscopy in 3 years or 2027.mariano quintana

## 2024-12-05 NOTE — SERVICEHPINOTES
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 Blood is only seen when having a bowel movement. He describes bright red blood on the toilet and the tissue. He reports had an anal fissure approx. 20 years ago but he does not feel the same symptoms this time. He denies constipation, his bowels move once daily " for the most part". His last colonoscopy showed diverticulosis and a daily fiber supplement was recommended, however he is not taking anything currently. He mentioned when wiping does not feel anything protruding, but is not sure about this. brHe denies abdominal pain, bloating or gases. Denies dark stools or mucus. He denies upper alarming GI symptoms. No reflux, nausea, dysphagia, or unintentional weight loss.  alcira eli
Family history is negative for polyps or colon cancer. He does have a personal history of adenomatous polyps. br4/09/2024 Colonoscopy-  Polyps in the ascending colon, descending colon and rectum. Mild diverticulosis. Pathology positive for tubular adenoma. Recommended to repeat colonoscopy in 3 years or 2027.alcira eli

## 2025-01-22 ENCOUNTER — OFFICE (AMBULATORY)
Dept: URBAN - METROPOLITAN AREA CLINIC 76 | Facility: CLINIC | Age: 65
End: 2025-01-22
Payer: COMMERCIAL

## 2025-01-22 VITALS — HEIGHT: 71 IN

## 2025-01-22 DIAGNOSIS — K64.0 FIRST DEGREE HEMORRHOIDS: ICD-10-CM

## 2025-01-22 PROBLEM — K64.8 BLEEDING INTERNAL HEMORRHOIDS: Status: ACTIVE | Noted: 2025-01-22

## 2025-01-22 PROCEDURE — 46221 LIGATION OF HEMORRHOID(S): CPT | Performed by: INTERNAL MEDICINE

## 2025-01-22 NOTE — SERVICEHPINOTES
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 Blood is only seen when having a bowel movement. He describes bright red blood on the toilet and the tissue. He reports had an anal fissure approx. 20 years ago but he does not feel the same symptoms this time.He denies constipation, his bowels move once daily " for the most part". His last colonoscopy showed diverticulosis and a daily fiber supplement was recommended, however he is not taking anything currently. He mentioned when wiping does not feel anything protruding, but is not sure about this.brHe denies abdominal pain, bloating or gases. Denies dark stools or mucus.He denies upper alarming GI symptoms. No reflux, nausea, dysphagia, or unintentional weight loss.  Family history is negative for polyps or colon cancer. He does have a personal history of adenomatous polyps. br4/09/2024 Colonoscopy-  Polyps in the ascending colon, descending colon and rectum. Mild diverticulosis. Pathology positive for tubular adenoma. Recommended to repeat colonoscopy in 3 years or 2027.

## 2025-02-10 ENCOUNTER — TELEPHONE (OUTPATIENT)
Dept: INTERNAL MEDICINE | Facility: CLINIC | Age: 65
End: 2025-02-10
Payer: COMMERCIAL

## 2025-02-11 ENCOUNTER — OFFICE VISIT (OUTPATIENT)
Dept: INTERNAL MEDICINE | Facility: CLINIC | Age: 65
End: 2025-02-11
Payer: COMMERCIAL

## 2025-02-11 VITALS
WEIGHT: 240 LBS | DIASTOLIC BLOOD PRESSURE: 80 MMHG | HEIGHT: 71 IN | SYSTOLIC BLOOD PRESSURE: 150 MMHG | BODY MASS INDEX: 33.6 KG/M2

## 2025-02-11 DIAGNOSIS — H53.2 DIPLOPIA: ICD-10-CM

## 2025-02-11 DIAGNOSIS — C7B.1 METASTATIC MERKEL CELL CARCINOMA TO LYMPH NODE: ICD-10-CM

## 2025-02-11 DIAGNOSIS — R26.9 GAIT ABNORMALITY: Primary | ICD-10-CM

## 2025-02-11 DIAGNOSIS — G45.9 TIA (TRANSIENT ISCHEMIC ATTACK): ICD-10-CM

## 2025-02-11 DIAGNOSIS — I10 ESSENTIAL HYPERTENSION: Chronic | ICD-10-CM

## 2025-02-11 PROCEDURE — 99214 OFFICE O/P EST MOD 30 MIN: CPT | Performed by: INTERNAL MEDICINE

## 2025-02-11 NOTE — PROGRESS NOTES
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 male who presents for    Patient Active Problem List   Diagnosis    Essential hypertension    Diastolic dysfunction    ROBERT (obstructive sleep apnea)    Numbness of foot    High cholesterol    Tubular adenoma of colon    Mild cognitive impairment       History of Present Illness     He was unsteady last Thursday and was leaning to the right. He went to the ER. He was seen by neurology and had an MRI of his brain and cervical spine. He was told that he had a TIA. He is still unsteady. His BP has been 130/70. Denies slurred speech. He had blurriness in his right eye on Saturday. He started PT today.     The lymph nodes in his left cheek have not responded to keytruda so he is being changed to opdivo.    Allergies   Allergen Reactions    Avelox [Moxifloxacin Hcl] Rash       Current Outpatient Medications on File Prior to Visit   Medication Sig Dispense Refill    atorvastatin (LIPITOR) 20 MG tablet TAKE 1 TABLET DAILY 90 tablet 3    Cholecalciferol (VITAMIN D-3 PO) Take  by mouth.      Cyanocobalamin (B-12 PO) Take  by mouth.      donepezil (ARICEPT) 10 MG tablet TAKE 1 TABLET EVERY NIGHT 90 tablet 3    hydroCHLOROthiazide 25 MG tablet TAKE 1 TABLET DAILY 90 tablet 3    ketoconazole (NIZORAL) 2 % shampoo   0    losartan (COZAAR) 100 MG tablet TAKE 1 TABLET DAILY 90 tablet 3    metoprolol succinate XL (TOPROL-XL) 100 MG 24 hr tablet TAKE 1 TABLET DAILY 90 tablet 3    [DISCONTINUED] Pembrolizumab (KEYTRUDA IV) Infuse  into a venous catheter.       No current facility-administered medications on file prior to visit.       Past Medical History:   Diagnosis Date    Cellulitis of lower extremity     Chronic venous hypertension with complication involving both sides     Decreased libido     Eczema     Leg swelling     Low testosterone level in male     Merkel cell carcinoma 2024  "   stage 3. one positive node    Nocturia     Obesity (BMI 30-39.9)     TIA (transient ischemic attack)     Tubular adenoma of colon 03/15/2019    X2    Varicose veins of legs     Vitreous detachment 08/23/2019       Past Surgical History:   Procedure Laterality Date    COLONOSCOPY  03/15/2019    dr menjivar    COLONOSCOPY  03/19/2024    INGUINAL HERNIA REPAIR Bilateral     LASIK      SKIN SURGERY      femoval of cancer cell    TONSILLECTOMY AND ADENOIDECTOMY         Family History   Problem Relation Age of Onset    Pneumonia Mother     Dementia Mother     Heart failure Father     Hypertension Father     Hypertension Brother        Social History     Socioeconomic History    Marital status:    Tobacco Use    Smoking status: Never    Smokeless tobacco: Never   Vaping Use    Vaping status: Never Used   Substance and Sexual Activity    Alcohol use: Yes     Comment: rare    Drug use: No    Sexual activity: Defer           The following portions of the patient's history were reviewed and updated as appropriate: problem list, allergies, current medications, past medical history, past family history, past social history, and past surgical history.    Review of Systems    Immunization History   Administered Date(s) Administered    COVID-19 (PFIZER) 12YRS+ (COMIRNATY) 04/15/2022, 09/27/2024    COVID-19 (PFIZER) Purple Cap Monovalent 12/28/2020, 01/18/2021, 10/08/2021    Covid-19 (Pfizer) Gray Cap Monovalent 11/04/2022    Flu Vaccine Intradermal Quad 18-64YR 10/18/2018, 10/18/2021, 12/02/2022    Flu Vaccine Quad PF >36MO 10/18/2018, 10/29/2019    Fluzone High-Dose 65+YRS 09/27/2024    Fluzone High-Dose 65+yrs 09/26/2023    Hepatitis A 05/22/2018, 12/27/2018    Pneumococcal Conjugate 20-Valent (PCV20) 09/26/2023    Shingrix 08/04/2021, 10/12/2021    Td (TDVAX) 05/23/2009    Tdap 06/19/2019       Objective   Vitals:    02/11/25 1102   BP: 150/80   Weight: 109 kg (240 lb)   Height: 180.3 cm (70.98\")     Body mass index is " 33.49 kg/m².  Physical Exam  Vitals reviewed.   Constitutional:       Appearance: He is well-developed.   HENT:      Head: Normocephalic and atraumatic.      Comments: Anterior to left ear is a palpable lymph node  Cardiovascular:      Rate and Rhythm: Normal rate and regular rhythm.      Heart sounds: Normal heart sounds, S1 normal and S2 normal.   Pulmonary:      Effort: Pulmonary effort is normal.      Breath sounds: Normal breath sounds.   Skin:     General: Skin is warm.   Neurological:      Mental Status: He is alert.      Comments: CN II-XII intact except did not check VIII    Gait is shuffling. He fell into the exam table with walking and I had to help right him.   Psychiatric:         Behavior: Behavior normal.         Procedures    Assessment & Plan   Diagnoses and all orders for this visit:    1. Gait abnormality (Primary)  -     Ambulatory Referral to Neurology  -     Ambulatory Referral to Home Health    2. Diplopia  -     Ambulatory Referral to Neurology    3. 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 TIA (transient ischemic attack)  -     Ambulatory Referral to Home Health    5. Essential hypertension  -     Basic Metabolic Panel  -     CBC & Differential               Reviewed DC summary, echo, MRI brain, MRI cervical spine, CTA, bmp and CBC. Repeat labs today. I am trying to get him in with neurology ASAP; I do not think needs to go to ER.    Return for Lab Today.

## 2025-02-12 LAB
BASOPHILS # BLD AUTO: 0.03 10*3/MM3 (ref 0–0.2)
BASOPHILS NFR BLD AUTO: 0.6 % (ref 0–1.5)
BUN SERPL-MCNC: 11 MG/DL (ref 8–23)
BUN/CREAT SERPL: 13.9 (ref 7–25)
CALCIUM SERPL-MCNC: 9.3 MG/DL (ref 8.6–10.5)
CHLORIDE SERPL-SCNC: 83 MMOL/L (ref 98–107)
CO2 SERPL-SCNC: 27 MMOL/L (ref 22–29)
CREAT SERPL-MCNC: 0.79 MG/DL (ref 0.76–1.27)
EGFRCR SERPLBLD CKD-EPI 2021: 98 ML/MIN/1.73
EOSINOPHIL # BLD AUTO: 0.26 10*3/MM3 (ref 0–0.4)
EOSINOPHIL NFR BLD AUTO: 4.8 % (ref 0.3–6.2)
ERYTHROCYTE [DISTWIDTH] IN BLOOD BY AUTOMATED COUNT: 13.9 % (ref 12.3–15.4)
GLUCOSE SERPL-MCNC: 97 MG/DL (ref 65–99)
HCT VFR BLD AUTO: 39.2 % (ref 37.5–51)
HGB BLD-MCNC: 12.5 G/DL (ref 13–17.7)
IMM GRANULOCYTES # BLD AUTO: 0.01 10*3/MM3 (ref 0–0.05)
IMM GRANULOCYTES NFR BLD AUTO: 0.2 % (ref 0–0.5)
LYMPHOCYTES # BLD AUTO: 0.7 10*3/MM3 (ref 0.7–3.1)
LYMPHOCYTES NFR BLD AUTO: 13 % (ref 19.6–45.3)
MCH RBC QN AUTO: 28.5 PG (ref 26.6–33)
MCHC RBC AUTO-ENTMCNC: 31.9 G/DL (ref 31.5–35.7)
MCV RBC AUTO: 89.5 FL (ref 79–97)
MONOCYTES # BLD AUTO: 0.66 10*3/MM3 (ref 0.1–0.9)
MONOCYTES NFR BLD AUTO: 12.2 % (ref 5–12)
NEUTROPHILS # BLD AUTO: 3.74 10*3/MM3 (ref 1.7–7)
NEUTROPHILS NFR BLD AUTO: 69.2 % (ref 42.7–76)
NRBC BLD AUTO-RTO: 0 /100 WBC (ref 0–0.2)
PLATELET # BLD AUTO: 292 10*3/MM3 (ref 140–450)
POTASSIUM SERPL-SCNC: 4.2 MMOL/L (ref 3.5–5.2)
RBC # BLD AUTO: 4.38 10*6/MM3 (ref 4.14–5.8)
SODIUM SERPL-SCNC: 120 MMOL/L (ref 136–145)
WBC # BLD AUTO: 5.4 10*3/MM3 (ref 3.4–10.8)

## 2025-02-20 ENCOUNTER — READMISSION MANAGEMENT (OUTPATIENT)
Dept: CALL CENTER | Facility: HOSPITAL | Age: 65
End: 2025-02-20
Payer: COMMERCIAL

## 2025-02-21 ENCOUNTER — OUTSIDE FACILITY SERVICE (OUTPATIENT)
Age: 65
End: 2025-02-21
Payer: COMMERCIAL

## 2025-02-21 NOTE — OUTREACH NOTE
Prep Survey      Flowsheet Row Responses   [facility] facility patient discharged from? Non-BH   Is LACE score < 7 ? Non-BH Discharge   Eligibility Not Eligible   What are the reasons patient is not eligible? [facility]  [Rehab Facility]   Does the patient have one of the following disease processes/diagnoses(primary or secondary)? Other   Prep survey completed? Yes            Mary CASTRO - Registered Nurse

## 2025-02-24 ENCOUNTER — OUTSIDE FACILITY SERVICE (OUTPATIENT)
Age: 65
End: 2025-02-24

## 2025-02-24 PROCEDURE — OUTSIDEPOS PR OUTSIDE POS PLACEHOLDER: Performed by: PHYSICAL MEDICINE & REHABILITATION

## 2025-02-25 ENCOUNTER — OUTSIDE FACILITY SERVICE (OUTPATIENT)
Age: 65
End: 2025-02-25

## 2025-02-25 PROCEDURE — OUTSIDEPOS PR OUTSIDE POS PLACEHOLDER: Performed by: PHYSICAL MEDICINE & REHABILITATION

## 2025-02-26 ENCOUNTER — TELEPHONE (OUTPATIENT)
Dept: INTERNAL MEDICINE | Facility: CLINIC | Age: 65
End: 2025-02-26

## 2025-02-26 NOTE — TELEPHONE ENCOUNTER
Provider: DR BRATTON    Caller: Meghan Krause    Relationship to Patient: Emergency Contact    Phone Number:     [phone]       Reason for Call:PATIENT'S WIFE IS CALLING TO STATE PATIENT HAS BEEN IN THE HOSPITAL AND NOW IS IN REHAB.  SHE STATES THERE WAS CONCERN THAT PATIENT'S CANCER MAY HAVE METASTASIZED.  SHE STATES A BIOPSY, LUMBAR PUNCTURE FLUID WAS SENT TO [facility], AND THE RESULTS WERE TO BE SENT BACK TO DR MUNIZ.   SHE IS WANTING TO KNOW IF DR MUNIZ HAS RECEIVED THE RESULTS FROM [facility].    PLEASE ADVISE.

## 2025-02-28 NOTE — TELEPHONE ENCOUNTER
Caller: Meghan Krause    Relationship: Emergency Contact    Best call back number: [phone]     What is the best time to reach you: ANYTIME    Who are you requesting to speak with (clinical staff, provider,  specific staff member):     What was the call regarding: PATIENTS SPOUSE RETURNING CALL TO     Is it okay if the provider responds through MyChart: NO

## 2025-02-28 NOTE — TELEPHONE ENCOUNTER
Caller: Meghan Krause    Relationship to patient: Emergency Contact    Best call back number:[phone]    Patient is needing: SHE WOULD LIKE DR. MUNIZ TO GIVE HER A CALL BACK TO DISCUSS LONGTERM CARE

## 2025-03-10 ENCOUNTER — READMISSION MANAGEMENT (OUTPATIENT)
Dept: CALL CENTER | Facility: HOSPITAL | Age: 65
End: 2025-03-10
Payer: COMMERCIAL

## 2025-03-10 NOTE — OUTREACH NOTE
Prep Survey      Flowsheet Row Responses   [facility] facility patient discharged from? Non-BH   Is LACE score < 7 ? Non-BH Discharge   Eligibility Not Eligible   What are the reasons patient is not eligible? Hospice/Pallative Care  [Hospice/Home]   Does the patient have one of the following disease processes/diagnoses(primary or secondary)? Other   Prep survey completed? Yes            Mary CASTRO - Registered Nurse

## 2025-04-02 ENCOUNTER — TELEPHONE (OUTPATIENT)
Dept: INTERNAL MEDICINE | Facility: CLINIC | Age: 65
End: 2025-04-02

## 2025-04-02 NOTE — TELEPHONE ENCOUNTER
Caller: ShawnagreyMeghan    Relationship: Emergency Contact    Best call back number: [phone]    What form or medical record are you requesting: PATIENT IS CURRENTLY IN HOSPICE CARE, PATIENT'S WIFE IS WANTING TO HAVE PAPERWORK COMPLETED BY DR. MUNIZ THAT WOULD GIVE HER POWER OF . PATIENT'S WIFE WOULD LIKE TO COME BY THE OFFICE, HAVE THIS SIGNED BY DR. MUNIZ AND WANTED TO KNOW IF THERE IS A NOTERY ON SITE. PLEASE ADVISE ASAP.     Timeframe paperwork needed: ASAP